# Patient Record
Sex: FEMALE | Race: WHITE | NOT HISPANIC OR LATINO | Employment: PART TIME | URBAN - METROPOLITAN AREA
[De-identification: names, ages, dates, MRNs, and addresses within clinical notes are randomized per-mention and may not be internally consistent; named-entity substitution may affect disease eponyms.]

---

## 2016-10-19 LAB
EXTERNAL ABO GROUPING: NORMAL
EXTERNAL ANTIBODY SCREEN: NEGATIVE
EXTERNAL HEPATITIS B SURFACE ANTIGEN: NEGATIVE
EXTERNAL RH FACTOR: POSITIVE
EXTERNAL SYPHILIS RPR SCREEN: NORMAL

## 2017-01-19 ENCOUNTER — ROUTINE PRENATAL (OUTPATIENT)
Dept: OBSTETRICS AND GYNECOLOGY | Facility: CLINIC | Age: 27
End: 2017-01-19

## 2017-01-19 VITALS — BODY MASS INDEX: 34.11 KG/M2 | WEIGHT: 231 LBS | SYSTOLIC BLOOD PRESSURE: 121 MMHG | DIASTOLIC BLOOD PRESSURE: 75 MMHG

## 2017-01-19 DIAGNOSIS — J45.909 ASTHMA AFFECTING PREGNANCY, ANTEPARTUM: ICD-10-CM

## 2017-01-19 DIAGNOSIS — Z34.02 ENCOUNTER FOR SUPERVISION OF NORMAL FIRST PREGNANCY IN SECOND TRIMESTER: Primary | ICD-10-CM

## 2017-01-19 DIAGNOSIS — O99.519 ASTHMA AFFECTING PREGNANCY, ANTEPARTUM: ICD-10-CM

## 2017-01-19 LAB — EXTERNAL GENETIC TESTING, MATERNAL BLOOD: NORMAL

## 2017-01-19 PROCEDURE — 0502F SUBSEQUENT PRENATAL CARE: CPT | Performed by: OBSTETRICS & GYNECOLOGY

## 2017-01-19 NOTE — PROGRESS NOTES
Patient without major complaints.  Her asthma has been much better controlled recently.  She does have an appointment to see Dr. Harding next week.  She does report rare episodes of feeling dizzy or lightheaded.  This is likely due to a vagal response.  We discussed preventative strategies such as increasing oral hydration, rising slowly, etc.  The patient verbalized understanding.  Otherwise doing well.  We discussed screening for aneuploidy and open neural tube defects today.  Limitations of screening were discussed.  The patient declines screening.  She reports that she would not want to terminate the pregnancy for any reason.  Plan ultrasound in 2 weeks for anatomy.  Return to see me in 4 weeks.  I spent 12 out of 15 minutes with the patient in face to face counseling of the above issues.

## 2017-01-19 NOTE — MR AVS SNAPSHOT
Melva Dunlap   1/19/2017 1:45 PM   Routine Prenatal    Dept Phone:  [phone]   Encounter #:  [id number]    Provider:  Bar Mendez MD   Department:  [facility]                Your Full Care Plan              Your Updated Medication List          This list is accurate as of: 1/19/17  2:00 PM.  Always use your most recent med list.                fluticasone 50 MCG/ACT nasal spray   Commonly known as:  FLONASE       fluticasone-salmeterol 100-50 MCG/DOSE DISKUS   Commonly known as:  ADVAIR DISKUS   Inhale 1 puff 2 (Two) Times a Day.       PRENATAL 1 PO       VENTOLIN HFA IN               We Performed the Following     Maternal Screen 4       You Were Diagnosed With        Codes Comments    Encounter for supervision of normal first pregnancy in second trimester    -  Primary ICD-10-CM: Z34.02  ICD-9-CM: V22.0     Asthma affecting pregnancy, antepartum     ICD-10-CM: O99.519, J45.909  ICD-9-CM: 648.93, 493.90       Instructions     None    Patient Instructions History      Upcoming Appointments     Visit Type Date Time Department    OB FOLLOWUP 1/19/2017  1:45 PM [facility]    ULTRASOUND 2/2/2017  2:00 PM [facility] [place]    OB FOLLOWUP 2/16/2017  2:30 PM [facility] PRES      MyChart Signup     Our records indicate that you have an active [employer] account.    You can view your After Visit Summary by going to [employer] and logging in with your Get[employer] username and password.  If you don't have a GetAFive username and password but a parent or guardian has access to your record, the parent or guardian should login with their own GetAFive username and password and access your record to view the After Visit Summary.    If you have questions, you can email [email or web address] or call [phone] to talk to our GetAFive staff.  Remember, GetAFive is NOT to be used for urgent needs.  For medical  emergencies, dial 911.               Other Info from Your Visit           Your Appointments     Feb 02, 2017  2:00 PM EST   Ultrasound with ULTRASOUND ESTHER ABERNATHY   [facility] (--)    [address]  [facility] [address] [phone]            Feb 16, 2017  2:30 PM EST   OB FOLLOWUP with Bar Mendez MD   [facility] (--)    [address] [facility] [address] [phone]              Allergies     No Known Allergies      Vital Signs     Blood Pressure Weight Last Menstrual Period Body Mass Index Smoking Status       121/75 231 lb (105 kg) 09/13/2016 (Exact Date) 34.11 kg/m2 Never Smoker       Problems and Diagnoses Noted     Asthma    Prenatal care      Results     Maternal Screen 4      Component    External Genetic Testing, Maternal Blood    declines

## 2017-02-02 ENCOUNTER — PROCEDURE VISIT (OUTPATIENT)
Dept: OBSTETRICS AND GYNECOLOGY | Facility: CLINIC | Age: 27
End: 2017-02-02

## 2017-02-02 DIAGNOSIS — Z36.89 SCREENING, ANTENATAL, FOR FETAL ANATOMIC SURVEY: Primary | ICD-10-CM

## 2017-02-02 PROCEDURE — 76805 OB US >/= 14 WKS SNGL FETUS: CPT | Performed by: OBSTETRICS & GYNECOLOGY

## 2017-02-16 ENCOUNTER — ROUTINE PRENATAL (OUTPATIENT)
Dept: OBSTETRICS AND GYNECOLOGY | Facility: CLINIC | Age: 27
End: 2017-02-16

## 2017-02-16 VITALS — DIASTOLIC BLOOD PRESSURE: 75 MMHG | BODY MASS INDEX: 35 KG/M2 | SYSTOLIC BLOOD PRESSURE: 121 MMHG | WEIGHT: 237 LBS

## 2017-02-16 DIAGNOSIS — J45.909 ASTHMA AFFECTING PREGNANCY, ANTEPARTUM: ICD-10-CM

## 2017-02-16 DIAGNOSIS — Z13.1 SCREENING FOR DIABETES MELLITUS: ICD-10-CM

## 2017-02-16 DIAGNOSIS — O99.519 ASTHMA AFFECTING PREGNANCY, ANTEPARTUM: ICD-10-CM

## 2017-02-16 DIAGNOSIS — Z34.02 ENCOUNTER FOR SUPERVISION OF NORMAL FIRST PREGNANCY IN SECOND TRIMESTER: Primary | ICD-10-CM

## 2017-02-16 PROCEDURE — 0502F SUBSEQUENT PRENATAL CARE: CPT | Performed by: OBSTETRICS & GYNECOLOGY

## 2017-02-16 RX ORDER — BUDESONIDE AND FORMOTEROL FUMARATE DIHYDRATE 80; 4.5 UG/1; UG/1
2 AEROSOL RESPIRATORY (INHALATION)
COMMUNITY
End: 2017-07-26

## 2017-02-16 NOTE — PROGRESS NOTES
Chief complaint: Pregnancy  History present illness: Patient is here for routine pregnancy visit.  She is without major complaints today.  She has seen the pulmonologist.  He started her on Symbicort.  She reports that her asthma has been well controlled as long as she takes her Symbicort daily.  She has not started to notice any fetal movement yet.  She is otherwise without complaints.  She denies vaginal bleeding or leakage of fluid.  The patient asked about starting on maternity leave.  She asks if it will be reasonable to start maternity leave on .  She is not having any major complications this time.  Objective: See flow sheet above  Imaging: Anatomy ultrasound with normal anatomic survey.  Assessment:    1.  27-year-old  1 at 21-2/7 weeks gestational age  2.  Asthma during pregnancy    Plan:  1.  Had a long conversation with the patient about the appropriate usage of maternity leave.  Explained to patient that currently I cannot see any medical reason why she would need to take maternity leave.  Patient's only, locations so far this pregnancy is with asthma, which has been well-controlled.  We will screen her for fetal growth throughout the third trimester with serial ultrasounds.  Should she begin to have issues related to complications of asthma which may be work related, it may be reasonable to start on maternity leave.  Absent these complications or any other new pregnancy-related compensations that may arise, I feel will be appropriate for the patient to continue to work well into the third trimester and even potentially up until the time of labor.  This was discussed with the patient who verbalized understanding.  2.  Glucose tolerance test at her next visit.  3.  Continue to follow up with pulmonology as scheduled.  Continue inhalers.  Serial growth every 4 weeks starting at 28 weeks.  I spent 12 out of 15 minutes with the patient in face to face counseling of the above issues.

## 2017-03-16 ENCOUNTER — RESULTS ENCOUNTER (OUTPATIENT)
Dept: OBSTETRICS AND GYNECOLOGY | Facility: CLINIC | Age: 27
End: 2017-03-16

## 2017-03-16 ENCOUNTER — ROUTINE PRENATAL (OUTPATIENT)
Dept: OBSTETRICS AND GYNECOLOGY | Facility: CLINIC | Age: 27
End: 2017-03-16

## 2017-03-16 VITALS — DIASTOLIC BLOOD PRESSURE: 86 MMHG | WEIGHT: 245 LBS | SYSTOLIC BLOOD PRESSURE: 124 MMHG | BODY MASS INDEX: 36.18 KG/M2

## 2017-03-16 DIAGNOSIS — J45.909 ASTHMA AFFECTING PREGNANCY, ANTEPARTUM: Primary | ICD-10-CM

## 2017-03-16 DIAGNOSIS — O99.519 ASTHMA AFFECTING PREGNANCY, ANTEPARTUM: Primary | ICD-10-CM

## 2017-03-16 DIAGNOSIS — Z34.02 ENCOUNTER FOR SUPERVISION OF NORMAL FIRST PREGNANCY IN SECOND TRIMESTER: ICD-10-CM

## 2017-03-16 DIAGNOSIS — Z13.1 SCREENING FOR DIABETES MELLITUS: ICD-10-CM

## 2017-03-16 LAB
EXTERNAL GTT 1 HOUR: 100
GLUCOSE 1H P 50 G GLC PO SERPL-MCNC: 100 MG/DL (ref 65–139)

## 2017-03-16 PROCEDURE — 0502F SUBSEQUENT PRENATAL CARE: CPT | Performed by: OBSTETRICS & GYNECOLOGY

## 2017-03-16 NOTE — PROGRESS NOTES
Chief complaint: Pregnancy, spotting  History present illness: Patient is here for routine pregnancy visit today.  She does report some light spotting when she went to the bathroom last night when she was wiping with the toilet paper.  It has resolved by this morning.  She does report fetal movement.  She denies contractions.  She does report some mild upper midline epigastric pain.  She denies nausea and vomiting.  No fevers or chills.  She denies dysuria.  Patient also has concerns about her activities at work.  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  She also requests to be able to sit down at work as needed.  She does have occasional dizzy spells, but these are manageable.  Objective: See flow sheet above  Abdomen: Soft, nontender, nondistended, no fundal tenderness.  She does have small diastases recti.  There is no hernia.  Fetal heart tones are 150.  Audible fetal movements are noted.  Assessment:  1.  27-year-old  1 at 25-2/7 weeks gestational age  2.  Vaginal spotting  3.  Screen for diabetes  4.  Asthma, well controlled  Plan:  1.  Reassurance is offered about the patient's spotting.  This seems to be a one-time thing.  No adverse signs are noted today.  Patient advised to notify us or come into the hospital if she has heavier bleeding like a period or heavier.  Also is advised about  labor signs including vaginal leakage of fluid, contractions, etc.  2.  Reassurance offered about the patient's mild epigastric pain.  This is likely related to her diastases recti.  No concerning signs on exam today.  3.  We discussed her work restrictions.  I explained to the patient that its very difficult to provide exact recommendations for any number of possible movements at work including pushing, pulling, twisting, bending, stooping, lifting, etc. I explained that there is little evidence regarding these activities.  I did  explain to the patient that recurrent strenuous physical labor has been associated with some types of adverse fetal outcomes such as fetal growth traction, etc.  In general I recommended frequent breaks, sitting, and avoiding lifting greater than 20 pounds.  I recommended avoiding any recurrent strenuous physical activity.  I wouldn't recommend any activity the post-patient risk of falling onto her abdomen.  She verbalized understanding.  Patient is to follow-up in 3 weeks in the office.  Ultrasound for growth secondary to asthma in 3 weeks.  GTT today.  I spent 20 out of 25 minutes with the patient in face to face counseling of the above issues.

## 2017-03-23 ENCOUNTER — TELEPHONE (OUTPATIENT)
Dept: OBSTETRICS AND GYNECOLOGY | Facility: CLINIC | Age: 27
End: 2017-03-23

## 2017-03-23 NOTE — TELEPHONE ENCOUNTER
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 It sounds like the patient's symptoms have resolved with just some rest.  She should be reassured that I do not think that any immediate intervention is necessary.  I would advise her to keep her regular scheduled appointment.    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  She sat for about 10 minutes and propped her feet up, it did help for awhile, but it came back when she started working again, so she left work early.  She woke up this morning feeling better, does have some back pain, but states that is normal for her.  Pt is concerned, please advise.    Pt # [phone]

## 2017-04-05 ENCOUNTER — ROUTINE PRENATAL (OUTPATIENT)
Dept: OBSTETRICS AND GYNECOLOGY | Facility: CLINIC | Age: 27
End: 2017-04-05

## 2017-04-05 ENCOUNTER — PROCEDURE VISIT (OUTPATIENT)
Dept: OBSTETRICS AND GYNECOLOGY | Facility: CLINIC | Age: 27
End: 2017-04-05

## 2017-04-05 VITALS — SYSTOLIC BLOOD PRESSURE: 125 MMHG | WEIGHT: 247 LBS | DIASTOLIC BLOOD PRESSURE: 76 MMHG | BODY MASS INDEX: 36.48 KG/M2

## 2017-04-05 DIAGNOSIS — J45.909 ASTHMA AFFECTING PREGNANCY, ANTEPARTUM: ICD-10-CM

## 2017-04-05 DIAGNOSIS — J45.909 ASTHMA AFFECTING PREGNANCY, ANTEPARTUM: Primary | ICD-10-CM

## 2017-04-05 DIAGNOSIS — O26.843 UTERINE SIZE DATE DISCREPANCY PREGNANCY, THIRD TRIMESTER: Primary | ICD-10-CM

## 2017-04-05 DIAGNOSIS — O99.519 ASTHMA AFFECTING PREGNANCY, ANTEPARTUM: ICD-10-CM

## 2017-04-05 DIAGNOSIS — Z23 NEED FOR TDAP VACCINATION: ICD-10-CM

## 2017-04-05 DIAGNOSIS — O99.519 ASTHMA AFFECTING PREGNANCY, ANTEPARTUM: Primary | ICD-10-CM

## 2017-04-05 PROBLEM — Z13.1 SCREENING FOR DIABETES MELLITUS: Status: RESOLVED | Noted: 2017-02-16 | Resolved: 2017-04-05

## 2017-04-05 PROCEDURE — 90715 TDAP VACCINE 7 YRS/> IM: CPT | Performed by: OBSTETRICS & GYNECOLOGY

## 2017-04-05 PROCEDURE — 90471 IMMUNIZATION ADMIN: CPT | Performed by: OBSTETRICS & GYNECOLOGY

## 2017-04-05 PROCEDURE — 0502F SUBSEQUENT PRENATAL CARE: CPT | Performed by: OBSTETRICS & GYNECOLOGY

## 2017-04-05 PROCEDURE — 76816 OB US FOLLOW-UP PER FETUS: CPT | Performed by: OBSTETRICS & GYNECOLOGY

## 2017-04-05 RX ORDER — ALBUTEROL SULFATE 90 UG/1
AEROSOL, METERED RESPIRATORY (INHALATION)
Refills: 5 | COMMUNITY
Start: 2017-03-29 | End: 2017-05-31 | Stop reason: SDUPTHER

## 2017-04-05 NOTE — PROGRESS NOTES
Chief complaint: Pregnancy  History of present illness: Patient is here for routine OB visit today.  She is without major complaints.  She reports she's had no more episodes of bleeding since her last visit.  She reports that she feels that she is doing well at work.  She denies frequent dizzy spells of this point.  They just happened rarely.  She reports feeling tired during the day, but feels this is normal.  Good fetal movement.  No contractions.  Patient reports that her asthma has generally been well-controlled.  Patient asked when she should start her maternity leave.  She reports that she would like to continue to work at this point.  Objective: See flow sheet above  Gen.: No acute distress, awake and oriented ×3  Abdomen: Soft, no fundal tenderness  Extremities: No lower extremity edema  Labs: One-hour glucose tolerance test 100  Ultrasound: Estimated fetal weight 2 lbs. 13 oz. or the 60th percentile.  Amniotic fluid index 12 cm.  Vertex.  Assessment:  1.  27-year-old  1 at 28 and one sevenths weeks gestational age  2.  Asthma, mild persistent  3.  Normal fetal growth  4.  Need for Tdap  Plan:  1.  Ultrasound findings discussed with the patient today all questions answered.  We'll plan to repeat ultrasound every 4 weeks for fetal growth secondary to history of asthma.  2.  Patient's asthma is generally been doing well.  Continue home inhalers as.  As you prescribed.  Advised patient about signs of asthma exacerbation during pregnancy.  3.  Regarding the patient's question about maternity leave.  Currently, I see no reason that the patient would need to start on maternity leave at any time in the near future.  Pregnancies been progressing well.  She has normal fetal growth.  Her blood pressures been normal.  Patient begins to develop issues and these area or with worsening respiratory function, may consider starting leave at that time.  She verbalized understanding.  4.  Return to the office in 2  weeks.  OB follow-up appointment in 4 weeks.  Ultrasound in 4 weeks for follow-up growth.  5.  Recommended Tdap today.  Patient received Tdap today.    I spent 12 out of 15 minutes with the patient in face to face counseling of the above issues.

## 2017-04-19 ENCOUNTER — ROUTINE PRENATAL (OUTPATIENT)
Dept: OBSTETRICS AND GYNECOLOGY | Facility: CLINIC | Age: 27
End: 2017-04-19

## 2017-04-19 VITALS — WEIGHT: 252 LBS | BODY MASS INDEX: 37.21 KG/M2 | SYSTOLIC BLOOD PRESSURE: 129 MMHG | DIASTOLIC BLOOD PRESSURE: 82 MMHG

## 2017-04-19 DIAGNOSIS — O99.519 ASTHMA AFFECTING PREGNANCY, ANTEPARTUM: ICD-10-CM

## 2017-04-19 DIAGNOSIS — Z34.02 ENCOUNTER FOR SUPERVISION OF NORMAL FIRST PREGNANCY IN SECOND TRIMESTER: Primary | ICD-10-CM

## 2017-04-19 DIAGNOSIS — J45.909 ASTHMA AFFECTING PREGNANCY, ANTEPARTUM: ICD-10-CM

## 2017-04-19 PROCEDURE — 0502F SUBSEQUENT PRENATAL CARE: CPT | Performed by: OBSTETRICS & GYNECOLOGY

## 2017-04-19 NOTE — PROGRESS NOTES
Chief complaint: Pregnancy, right knee pain  History present illness: Patient is here for her prenatal visit today.  She is without major complaints.  She does report some occasional pain and weakness in her right knee.  Otherwise she is doing well.  She does report increased frequency of using her inhaler.  She has not been using the Symbicort inhaler for the last 2 weeks.  She plans to get refilled soon.  She notes good fetal movement.  Denies contractions, vaginal bleeding, leakage of fluid  Objective: See vital signs above  Gen.: No acute distress, awake and oriented ×3  Abdomen: Soft, nontender, fundal height 31 cm, fetal heart tones 140  Showed: No lower extremity edema, tenderness  Assessment:  1.  27-year-old  1 at 30-1/7 weeks gestational age  2.  Asthma during pregnancy  Plan:  1.  Patient's encouraged to restart her Symbicort inhaler and continue albuterol as needed.  2.  Plan repeat ultrasound in 2 weeks for growth secondary to asthma in pregnancy  3.  Return to the office in 2 weeks before.  Reassurance offered to the patient that her right knee pain, likely due to increased weight and body changes.  No concerning signs on exam today.

## 2017-05-03 ENCOUNTER — PROCEDURE VISIT (OUTPATIENT)
Dept: OBSTETRICS AND GYNECOLOGY | Facility: CLINIC | Age: 27
End: 2017-05-03

## 2017-05-03 ENCOUNTER — ROUTINE PRENATAL (OUTPATIENT)
Dept: OBSTETRICS AND GYNECOLOGY | Facility: CLINIC | Age: 27
End: 2017-05-03

## 2017-05-03 VITALS — SYSTOLIC BLOOD PRESSURE: 134 MMHG | DIASTOLIC BLOOD PRESSURE: 89 MMHG | BODY MASS INDEX: 38.25 KG/M2 | WEIGHT: 259 LBS

## 2017-05-03 DIAGNOSIS — O99.519 ASTHMA AFFECTING PREGNANCY, ANTEPARTUM: ICD-10-CM

## 2017-05-03 DIAGNOSIS — J45.909 ASTHMA COMPLICATING PREGNANCY, ANTEPARTUM: ICD-10-CM

## 2017-05-03 DIAGNOSIS — J45.909 ASTHMA AFFECTING PREGNANCY, ANTEPARTUM: ICD-10-CM

## 2017-05-03 DIAGNOSIS — O99.519 ASTHMA COMPLICATING PREGNANCY, ANTEPARTUM: ICD-10-CM

## 2017-05-03 DIAGNOSIS — Z34.03 ENCOUNTER FOR SUPERVISION OF NORMAL FIRST PREGNANCY IN THIRD TRIMESTER: Primary | ICD-10-CM

## 2017-05-03 DIAGNOSIS — Z36.89 ENCOUNTER FOR ULTRASOUND TO CHECK FETAL GROWTH: Primary | ICD-10-CM

## 2017-05-03 PROCEDURE — 0502F SUBSEQUENT PRENATAL CARE: CPT | Performed by: OBSTETRICS & GYNECOLOGY

## 2017-05-03 PROCEDURE — 76816 OB US FOLLOW-UP PER FETUS: CPT | Performed by: OBSTETRICS & GYNECOLOGY

## 2017-05-17 ENCOUNTER — ROUTINE PRENATAL (OUTPATIENT)
Dept: OBSTETRICS AND GYNECOLOGY | Facility: CLINIC | Age: 27
End: 2017-05-17

## 2017-05-17 VITALS — DIASTOLIC BLOOD PRESSURE: 101 MMHG | BODY MASS INDEX: 38.69 KG/M2 | WEIGHT: 262 LBS | SYSTOLIC BLOOD PRESSURE: 144 MMHG

## 2017-05-17 DIAGNOSIS — O16.3 ELEVATED BLOOD PRESSURE AFFECTING PREGNANCY IN THIRD TRIMESTER, ANTEPARTUM: Primary | ICD-10-CM

## 2017-05-17 DIAGNOSIS — Z34.03 ENCOUNTER FOR SUPERVISION OF NORMAL FIRST PREGNANCY IN THIRD TRIMESTER: ICD-10-CM

## 2017-05-17 PROCEDURE — 0502F SUBSEQUENT PRENATAL CARE: CPT | Performed by: OBSTETRICS & GYNECOLOGY

## 2017-05-18 LAB
ALBUMIN SERPL-MCNC: 3.6 G/DL (ref 3.5–5.2)
ALBUMIN/GLOB SERPL: 1.3 G/DL
ALP SERPL-CCNC: 105 U/L (ref 39–117)
ALT SERPL-CCNC: 20 U/L (ref 1–33)
AST SERPL-CCNC: 17 U/L (ref 1–32)
BILIRUB SERPL-MCNC: 0.2 MG/DL (ref 0.1–1.2)
BUN SERPL-MCNC: 5 MG/DL (ref 6–20)
BUN/CREAT SERPL: 8.5 (ref 7–25)
CALCIUM SERPL-MCNC: 9.4 MG/DL (ref 8.6–10.5)
CHLORIDE SERPL-SCNC: 100 MMOL/L (ref 98–107)
CO2 SERPL-SCNC: 24.3 MMOL/L (ref 22–29)
CREAT SERPL-MCNC: 0.59 MG/DL (ref 0.57–1)
ERYTHROCYTE [DISTWIDTH] IN BLOOD BY AUTOMATED COUNT: 13.2 % (ref 11.7–13)
GLOBULIN SER CALC-MCNC: 2.8 GM/DL
GLUCOSE SERPL-MCNC: 81 MG/DL (ref 65–99)
HCT VFR BLD AUTO: 40.6 % (ref 35.6–45.5)
HGB BLD-MCNC: 13.1 G/DL (ref 11.9–15.5)
MCH RBC QN AUTO: 27.9 PG (ref 26.9–32)
MCHC RBC AUTO-ENTMCNC: 32.3 G/DL (ref 32.4–36.3)
MCV RBC AUTO: 86.6 FL (ref 80.5–98.2)
PLATELET # BLD AUTO: 316 10*3/MM3 (ref 140–500)
POTASSIUM SERPL-SCNC: 4.9 MMOL/L (ref 3.5–5.2)
PROT SERPL-MCNC: 6.4 G/DL (ref 6–8.5)
RBC # BLD AUTO: 4.69 10*6/MM3 (ref 3.9–5.2)
SODIUM SERPL-SCNC: 136 MMOL/L (ref 136–145)
WBC # BLD AUTO: 8.95 10*3/MM3 (ref 4.5–10.7)

## 2017-05-19 ENCOUNTER — TELEPHONE (OUTPATIENT)
Dept: OBSTETRICS AND GYNECOLOGY | Facility: CLINIC | Age: 27
End: 2017-05-19

## 2017-05-22 ENCOUNTER — TELEPHONE (OUTPATIENT)
Dept: OBSTETRICS AND GYNECOLOGY | Facility: CLINIC | Age: 27
End: 2017-05-22

## 2017-05-22 DIAGNOSIS — O16.3 ELEVATED BLOOD PRESSURE AFFECTING PREGNANCY IN THIRD TRIMESTER, ANTEPARTUM: Primary | ICD-10-CM

## 2017-05-23 ENCOUNTER — PROCEDURE VISIT (OUTPATIENT)
Dept: OBSTETRICS AND GYNECOLOGY | Facility: CLINIC | Age: 27
End: 2017-05-23

## 2017-05-23 ENCOUNTER — ROUTINE PRENATAL (OUTPATIENT)
Dept: OBSTETRICS AND GYNECOLOGY | Facility: CLINIC | Age: 27
End: 2017-05-23

## 2017-05-23 VITALS — BODY MASS INDEX: 38.84 KG/M2 | WEIGHT: 263 LBS | SYSTOLIC BLOOD PRESSURE: 131 MMHG | DIASTOLIC BLOOD PRESSURE: 85 MMHG

## 2017-05-23 DIAGNOSIS — O13.3 PIH (PREGNANCY INDUCED HYPERTENSION), THIRD TRIMESTER: ICD-10-CM

## 2017-05-23 DIAGNOSIS — O16.3 ELEVATED BLOOD PRESSURE AFFECTING PREGNANCY IN THIRD TRIMESTER, ANTEPARTUM: ICD-10-CM

## 2017-05-23 DIAGNOSIS — O99.213 OBESITY AFFECTING PREGNANCY IN THIRD TRIMESTER: ICD-10-CM

## 2017-05-23 DIAGNOSIS — O99.519 ASTHMA AFFECTING PREGNANCY, ANTEPARTUM: Primary | ICD-10-CM

## 2017-05-23 DIAGNOSIS — Z34.03 ENCOUNTER FOR SUPERVISION OF NORMAL FIRST PREGNANCY IN THIRD TRIMESTER: Primary | ICD-10-CM

## 2017-05-23 DIAGNOSIS — J45.909 ASTHMA AFFECTING PREGNANCY, ANTEPARTUM: Primary | ICD-10-CM

## 2017-05-23 PROCEDURE — 76819 FETAL BIOPHYS PROFIL W/O NST: CPT | Performed by: OBSTETRICS & GYNECOLOGY

## 2017-05-23 PROCEDURE — 76816 OB US FOLLOW-UP PER FETUS: CPT | Performed by: OBSTETRICS & GYNECOLOGY

## 2017-05-23 PROCEDURE — 0502F SUBSEQUENT PRENATAL CARE: CPT | Performed by: OBSTETRICS & GYNECOLOGY

## 2017-05-24 ENCOUNTER — TELEPHONE (OUTPATIENT)
Dept: OBSTETRICS AND GYNECOLOGY | Facility: CLINIC | Age: 27
End: 2017-05-24

## 2017-05-24 LAB
PROT 24H UR-MRATE: 354 MG/24 HR (ref 30–150)
PROT UR-MCNC: 11.6 MG/DL

## 2017-05-25 ENCOUNTER — TELEPHONE (OUTPATIENT)
Dept: OBSTETRICS AND GYNECOLOGY | Facility: CLINIC | Age: 27
End: 2017-05-25

## 2017-05-26 LAB — B-HEM STREP SPEC QL CULT: POSITIVE

## 2017-05-31 ENCOUNTER — ROUTINE PRENATAL (OUTPATIENT)
Dept: OBSTETRICS AND GYNECOLOGY | Facility: CLINIC | Age: 27
End: 2017-05-31

## 2017-05-31 VITALS — WEIGHT: 264 LBS | DIASTOLIC BLOOD PRESSURE: 100 MMHG | SYSTOLIC BLOOD PRESSURE: 153 MMHG | BODY MASS INDEX: 38.99 KG/M2

## 2017-05-31 DIAGNOSIS — O14.03: ICD-10-CM

## 2017-05-31 DIAGNOSIS — Z34.03 ENCOUNTER FOR SUPERVISION OF NORMAL FIRST PREGNANCY IN THIRD TRIMESTER: Primary | ICD-10-CM

## 2017-05-31 LAB — EXTERNAL GROUP B STREP ANTIGEN: NORMAL

## 2017-05-31 PROCEDURE — 0502F SUBSEQUENT PRENATAL CARE: CPT | Performed by: OBSTETRICS & GYNECOLOGY

## 2017-06-02 ENCOUNTER — PROCEDURE VISIT (OUTPATIENT)
Dept: OBSTETRICS AND GYNECOLOGY | Facility: CLINIC | Age: 27
End: 2017-06-02

## 2017-06-02 ENCOUNTER — ROUTINE PRENATAL (OUTPATIENT)
Dept: OBSTETRICS AND GYNECOLOGY | Facility: CLINIC | Age: 27
End: 2017-06-02

## 2017-06-02 VITALS — WEIGHT: 267 LBS | BODY MASS INDEX: 39.43 KG/M2 | DIASTOLIC BLOOD PRESSURE: 102 MMHG | SYSTOLIC BLOOD PRESSURE: 141 MMHG

## 2017-06-02 DIAGNOSIS — O14.03: ICD-10-CM

## 2017-06-02 DIAGNOSIS — O99.519 ASTHMA AFFECTING PREGNANCY, ANTEPARTUM: Primary | ICD-10-CM

## 2017-06-02 DIAGNOSIS — Z34.03 ENCOUNTER FOR SUPERVISION OF NORMAL FIRST PREGNANCY IN THIRD TRIMESTER: Primary | ICD-10-CM

## 2017-06-02 DIAGNOSIS — O16.3 ELEVATED BLOOD PRESSURE AFFECTING PREGNANCY IN THIRD TRIMESTER, ANTEPARTUM: ICD-10-CM

## 2017-06-02 DIAGNOSIS — J45.909 ASTHMA AFFECTING PREGNANCY, ANTEPARTUM: Primary | ICD-10-CM

## 2017-06-02 DIAGNOSIS — O13.3 PIH (PREGNANCY INDUCED HYPERTENSION), THIRD TRIMESTER: ICD-10-CM

## 2017-06-02 PROCEDURE — 76819 FETAL BIOPHYS PROFIL W/O NST: CPT | Performed by: OBSTETRICS & GYNECOLOGY

## 2017-06-02 PROCEDURE — 0502F SUBSEQUENT PRENATAL CARE: CPT | Performed by: OBSTETRICS & GYNECOLOGY

## 2017-06-02 NOTE — PROGRESS NOTES
Ob follow up    Melva Dunlap is a 27 y.o.  36w3d patient being seen today for her obstetrical visit. Patient reports headache yesterday, treated with Tylenol. . Fetal movement: normal.      ROS - Denies leaking fluid, vaginal bleeding and notes good fetal movement.     BP (!) 141/102  Wt 267 lb (121 kg)  LMP 2016 (Exact Date)  BMI 39.43 kg/m2    FHT:  151 BPM    Uterine Size: size equals dates   Presentations: cephalic   Pelvic Exam:     Dilation: deferred     Effacement: deferred    Station:  deferred                 Assessment    1) Pregnancy at 36w3d  2) Fetal status reassuring   3) GBS status - Positive  4) Mild Pre-eclampsia   Asymptomatic  DTRs 2+, No clonus  BP at home 134/110.   Bpp 8/8, ZAHIDA 10.65 cm   Given parameters for BP and PIH warnings   See Dr. Mendez on Monday     Plan    Labor warnings   [facility]        Clif Berry MD   2017  9:10 AM

## 2017-06-05 ENCOUNTER — HOSPITAL ENCOUNTER (OUTPATIENT)
Facility: HOSPITAL | Age: 27
Setting detail: OBSERVATION
Discharge: HOME | End: 2017-06-06
Attending: OBSTETRICS & GYNECOLOGY | Admitting: OBSTETRICS & GYNECOLOGY

## 2017-06-05 ENCOUNTER — ROUTINE PRENATAL (OUTPATIENT)
Dept: OBSTETRICS AND GYNECOLOGY | Facility: CLINIC | Age: 27
End: 2017-06-05

## 2017-06-05 ENCOUNTER — PROCEDURE VISIT (OUTPATIENT)
Dept: OBSTETRICS AND GYNECOLOGY | Facility: CLINIC | Age: 27
End: 2017-06-05

## 2017-06-05 VITALS — DIASTOLIC BLOOD PRESSURE: 86 MMHG | WEIGHT: 267 LBS | SYSTOLIC BLOOD PRESSURE: 146 MMHG | BODY MASS INDEX: 39.43 KG/M2

## 2017-06-05 DIAGNOSIS — J45.909 ASTHMA AFFECTING PREGNANCY, ANTEPARTUM: Primary | ICD-10-CM

## 2017-06-05 DIAGNOSIS — O13.3 PIH (PREGNANCY INDUCED HYPERTENSION), THIRD TRIMESTER: ICD-10-CM

## 2017-06-05 DIAGNOSIS — O14.03 MILD PRE-ECLAMPSIA IN THIRD TRIMESTER: Primary | ICD-10-CM

## 2017-06-05 DIAGNOSIS — O99.519 ASTHMA AFFECTING PREGNANCY, ANTEPARTUM: Primary | ICD-10-CM

## 2017-06-05 PROBLEM — Z34.90 PREGNANCY: Status: ACTIVE | Noted: 2017-06-05

## 2017-06-05 LAB
EXPIRATION DATE: ABNORMAL
Lab: ABNORMAL
PROT UR STRIP-MCNC: ABNORMAL MG/DL

## 2017-06-05 PROCEDURE — 76819 FETAL BIOPHYS PROFIL W/O NST: CPT | Performed by: OBSTETRICS & GYNECOLOGY

## 2017-06-05 PROCEDURE — 0502F SUBSEQUENT PRENATAL CARE: CPT | Performed by: OBSTETRICS & GYNECOLOGY

## 2017-06-05 RX ORDER — RANITIDINE 150 MG/1
150 TABLET ORAL 2 TIMES DAILY
COMMUNITY
End: 2017-07-26

## 2017-06-06 VITALS
HEART RATE: 71 BPM | TEMPERATURE: 97.7 F | DIASTOLIC BLOOD PRESSURE: 61 MMHG | BODY MASS INDEX: 39.66 KG/M2 | HEIGHT: 69 IN | RESPIRATION RATE: 18 BRPM | SYSTOLIC BLOOD PRESSURE: 141 MMHG | OXYGEN SATURATION: 97 % | WEIGHT: 267.8 LBS

## 2017-06-06 LAB
ALBUMIN SERPL-MCNC: 3.2 G/DL (ref 3.5–5.2)
ALBUMIN SERPL-MCNC: 3.6 G/DL (ref 3.5–5.2)
ALBUMIN/GLOB SERPL: 1 G/DL
ALBUMIN/GLOB SERPL: 1.1 G/DL
ALP SERPL-CCNC: 111 U/L (ref 39–117)
ALP SERPL-CCNC: 126 U/L (ref 39–117)
ALT SERPL W P-5'-P-CCNC: 25 U/L (ref 1–33)
ALT SERPL-CCNC: 21 U/L (ref 1–33)
ANION GAP SERPL CALCULATED.3IONS-SCNC: 15.2 MMOL/L
AST SERPL-CCNC: 22 U/L (ref 1–32)
AST SERPL-CCNC: 23 U/L (ref 1–32)
BACTERIA UR QL AUTO: ABNORMAL /HPF
BASOPHILS # BLD AUTO: 0.02 10*3/MM3 (ref 0–0.2)
BASOPHILS NFR BLD AUTO: 0.2 % (ref 0–1.5)
BILIRUB SERPL-MCNC: 0.2 MG/DL (ref 0.1–1.2)
BILIRUB SERPL-MCNC: <0.2 MG/DL (ref 0.1–1.2)
BILIRUB UR QL STRIP: NEGATIVE
BUN BLD-MCNC: 9 MG/DL (ref 6–20)
BUN SERPL-MCNC: 7 MG/DL (ref 6–20)
BUN/CREAT SERPL: 11.3 (ref 7–25)
BUN/CREAT SERPL: 15.8 (ref 7–25)
CALCIUM SERPL-MCNC: 9.9 MG/DL (ref 8.6–10.5)
CALCIUM SPEC-SCNC: 9.3 MG/DL (ref 8.6–10.5)
CHLORIDE SERPL-SCNC: 101 MMOL/L (ref 98–107)
CHLORIDE SERPL-SCNC: 99 MMOL/L (ref 98–107)
CLARITY UR: CLEAR
CO2 SERPL-SCNC: 22.8 MMOL/L (ref 22–29)
CO2 SERPL-SCNC: 24.3 MMOL/L (ref 22–29)
COLOR UR: YELLOW
CREAT BLD-MCNC: 0.57 MG/DL (ref 0.57–1)
CREAT SERPL-MCNC: 0.62 MG/DL (ref 0.57–1)
DEPRECATED RDW RBC AUTO: 43.8 FL (ref 37–54)
EOSINOPHIL # BLD AUTO: 0.27 10*3/MM3 (ref 0–0.7)
EOSINOPHIL NFR BLD AUTO: 2.1 % (ref 0.3–6.2)
ERYTHROCYTE [DISTWIDTH] IN BLOOD BY AUTOMATED COUNT: 14.3 % (ref 11.7–13)
ERYTHROCYTE [DISTWIDTH] IN BLOOD BY AUTOMATED COUNT: 14.6 % (ref 11.7–13)
GFR SERPL CREATININE-BSD FRML MDRD: 127 ML/MIN/1.73
GLOBULIN SER CALC-MCNC: 3.2 GM/DL
GLOBULIN UR ELPH-MCNC: 3.1 GM/DL
GLUCOSE BLD-MCNC: 104 MG/DL (ref 65–99)
GLUCOSE SERPL-MCNC: 86 MG/DL (ref 65–99)
GLUCOSE UR STRIP-MCNC: NEGATIVE MG/DL
HCT VFR BLD AUTO: 39.7 % (ref 35.6–45.5)
HCT VFR BLD AUTO: 42.7 % (ref 35.6–45.5)
HGB BLD-MCNC: 12.9 G/DL (ref 11.9–15.5)
HGB BLD-MCNC: 13.8 G/DL (ref 11.9–15.5)
HGB UR QL STRIP.AUTO: NEGATIVE
HYALINE CASTS UR QL AUTO: ABNORMAL /LPF
IMM GRANULOCYTES # BLD: 0.08 10*3/MM3 (ref 0–0.03)
IMM GRANULOCYTES NFR BLD: 0.6 % (ref 0–0.5)
KETONES UR QL STRIP: NEGATIVE
LEUKOCYTE ESTERASE UR QL STRIP.AUTO: ABNORMAL
LYMPHOCYTES # BLD AUTO: 2.35 10*3/MM3 (ref 0.9–4.8)
LYMPHOCYTES NFR BLD AUTO: 18 % (ref 19.6–45.3)
MCH RBC QN AUTO: 28 PG (ref 26.9–32)
MCH RBC QN AUTO: 28.5 PG (ref 26.9–32)
MCHC RBC AUTO-ENTMCNC: 32.3 G/DL (ref 32.4–36.3)
MCHC RBC AUTO-ENTMCNC: 32.5 G/DL (ref 32.4–36.3)
MCV RBC AUTO: 86.1 FL (ref 80.5–98.2)
MCV RBC AUTO: 88.2 FL (ref 80.5–98.2)
MONOCYTES # BLD AUTO: 0.98 10*3/MM3 (ref 0.2–1.2)
MONOCYTES NFR BLD AUTO: 7.5 % (ref 5–12)
NEUTROPHILS # BLD AUTO: 9.34 10*3/MM3 (ref 1.9–8.1)
NEUTROPHILS NFR BLD AUTO: 71.6 % (ref 42.7–76)
NITRITE UR QL STRIP: NEGATIVE
PH UR STRIP.AUTO: 6 [PH] (ref 5–8)
PLATELET # BLD AUTO: 268 10*3/MM3 (ref 140–500)
PLATELET # BLD AUTO: 316 10*3/MM3 (ref 140–500)
PMV BLD AUTO: 10.5 FL (ref 6–12)
POTASSIUM BLD-SCNC: 3.6 MMOL/L (ref 3.5–5.2)
POTASSIUM SERPL-SCNC: 4.2 MMOL/L (ref 3.5–5.2)
PROT SERPL-MCNC: 6.3 G/DL (ref 6–8.5)
PROT SERPL-MCNC: 6.8 G/DL (ref 6–8.5)
PROT UR QL STRIP: NEGATIVE
RBC # BLD AUTO: 4.61 10*6/MM3 (ref 3.9–5.2)
RBC # BLD AUTO: 4.84 10*6/MM3 (ref 3.9–5.2)
RBC # UR: ABNORMAL /HPF
REF LAB TEST METHOD: ABNORMAL
SODIUM BLD-SCNC: 139 MMOL/L (ref 136–145)
SODIUM SERPL-SCNC: 137 MMOL/L (ref 136–145)
SP GR UR STRIP: 1.01 (ref 1–1.03)
SQUAMOUS #/AREA URNS HPF: ABNORMAL /HPF
UROBILINOGEN UR QL STRIP: ABNORMAL
WBC # BLD AUTO: 12.16 10*3/MM3 (ref 4.5–10.7)
WBC NRBC COR # BLD: 13.04 10*3/MM3 (ref 4.5–10.7)
WBC UR QL AUTO: ABNORMAL /HPF

## 2017-06-06 PROCEDURE — 85025 COMPLETE CBC W/AUTO DIFF WBC: CPT | Performed by: OBSTETRICS & GYNECOLOGY

## 2017-06-06 PROCEDURE — 81001 URINALYSIS AUTO W/SCOPE: CPT | Performed by: OBSTETRICS & GYNECOLOGY

## 2017-06-06 PROCEDURE — 80053 COMPREHEN METABOLIC PANEL: CPT | Performed by: OBSTETRICS & GYNECOLOGY

## 2017-06-06 PROCEDURE — G0378 HOSPITAL OBSERVATION PER HR: HCPCS

## 2017-06-06 PROCEDURE — 87086 URINE CULTURE/COLONY COUNT: CPT | Performed by: OBSTETRICS & GYNECOLOGY

## 2017-06-06 PROCEDURE — 59025 FETAL NON-STRESS TEST: CPT

## 2017-06-06 PROCEDURE — 59025 FETAL NON-STRESS TEST: CPT | Performed by: OBSTETRICS & GYNECOLOGY

## 2017-06-06 NOTE — NON STRESS TEST
Melva Dunlap, a  at 37w0d with an WILNER of 2017, by Ultrasound, was seen at [facility] for a nonstress test.    Chief Complaint   Patient presents with   • Hypertension     Pt reports her BP was elevated at home running 160's/100's. Pt reports +FM, reports some haley chakraborty not painful, denies LOF or current VB. Reports some spotting after getting cervix checked. Pt denies HA, blurry vision or RUQ pain.       Interpretation A  Nonstress Test Interpretation A: Reactive (17 0100 : Pia Witt, BENJAMIN)

## 2017-06-07 ENCOUNTER — TELEPHONE (OUTPATIENT)
Dept: OBSTETRICS AND GYNECOLOGY | Facility: CLINIC | Age: 27
End: 2017-06-07

## 2017-06-07 LAB — BACTERIA SPEC AEROBE CULT: NORMAL

## 2017-06-07 NOTE — TELEPHONE ENCOUNTER
----- Message from Clif Berry MD sent at 6/7/2017  9:52 AM EDT -----  Pam, normal urine culture. Please let her know. Thanks Dr. Berry

## 2017-06-10 ENCOUNTER — HOSPITAL ENCOUNTER (OUTPATIENT)
Facility: HOSPITAL | Age: 27
Setting detail: OBSERVATION
Discharge: HOME | End: 2017-06-10
Attending: OBSTETRICS & GYNECOLOGY | Admitting: OBSTETRICS & GYNECOLOGY

## 2017-06-10 VITALS
SYSTOLIC BLOOD PRESSURE: 125 MMHG | HEIGHT: 69 IN | WEIGHT: 271 LBS | RESPIRATION RATE: 18 BRPM | TEMPERATURE: 98.4 F | HEART RATE: 69 BPM | BODY MASS INDEX: 40.14 KG/M2 | DIASTOLIC BLOOD PRESSURE: 69 MMHG | OXYGEN SATURATION: 97 %

## 2017-06-10 LAB
ALBUMIN SERPL-MCNC: 3.1 G/DL (ref 3.5–5.2)
ALBUMIN/GLOB SERPL: 1 G/DL
ALP SERPL-CCNC: 110 U/L (ref 39–117)
ALT SERPL W P-5'-P-CCNC: 15 U/L (ref 1–33)
ANION GAP SERPL CALCULATED.3IONS-SCNC: 15.9 MMOL/L
AST SERPL-CCNC: 13 U/L (ref 1–32)
BACTERIA UR QL AUTO: ABNORMAL /HPF
BASOPHILS # BLD AUTO: 0.02 10*3/MM3 (ref 0–0.2)
BASOPHILS NFR BLD AUTO: 0.2 % (ref 0–1.5)
BILIRUB SERPL-MCNC: <0.2 MG/DL (ref 0.1–1.2)
BILIRUB UR QL STRIP: NEGATIVE
BUN BLD-MCNC: 11 MG/DL (ref 6–20)
BUN/CREAT SERPL: 17.5 (ref 7–25)
CALCIUM SPEC-SCNC: 9 MG/DL (ref 8.6–10.5)
CHLORIDE SERPL-SCNC: 100 MMOL/L (ref 98–107)
CLARITY UR: ABNORMAL
CO2 SERPL-SCNC: 19.1 MMOL/L (ref 22–29)
COD CRY URNS QL: ABNORMAL /HPF
COLOR UR: YELLOW
CREAT BLD-MCNC: 0.63 MG/DL (ref 0.57–1)
DEPRECATED RDW RBC AUTO: 44.6 FL (ref 37–54)
EOSINOPHIL # BLD AUTO: 0.21 10*3/MM3 (ref 0–0.7)
EOSINOPHIL NFR BLD AUTO: 1.8 % (ref 0.3–6.2)
ERYTHROCYTE [DISTWIDTH] IN BLOOD BY AUTOMATED COUNT: 14.5 % (ref 11.7–13)
GFR SERPL CREATININE-BSD FRML MDRD: 113 ML/MIN/1.73
GLOBULIN UR ELPH-MCNC: 3.2 GM/DL
GLUCOSE BLD-MCNC: 102 MG/DL (ref 65–99)
GLUCOSE UR STRIP-MCNC: NEGATIVE MG/DL
HCT VFR BLD AUTO: 39 % (ref 35.6–45.5)
HGB BLD-MCNC: 12.6 G/DL (ref 11.9–15.5)
HGB UR QL STRIP.AUTO: NEGATIVE
HYALINE CASTS UR QL AUTO: ABNORMAL /LPF
IMM GRANULOCYTES # BLD: 0.05 10*3/MM3 (ref 0–0.03)
IMM GRANULOCYTES NFR BLD: 0.4 % (ref 0–0.5)
KETONES UR QL STRIP: NEGATIVE
LEUKOCYTE ESTERASE UR QL STRIP.AUTO: ABNORMAL
LYMPHOCYTES # BLD AUTO: 2.03 10*3/MM3 (ref 0.9–4.8)
LYMPHOCYTES NFR BLD AUTO: 17.5 % (ref 19.6–45.3)
MCH RBC QN AUTO: 27.7 PG (ref 26.9–32)
MCHC RBC AUTO-ENTMCNC: 32.3 G/DL (ref 32.4–36.3)
MCV RBC AUTO: 85.7 FL (ref 80.5–98.2)
MONOCYTES # BLD AUTO: 0.95 10*3/MM3 (ref 0.2–1.2)
MONOCYTES NFR BLD AUTO: 8.2 % (ref 5–12)
NEUTROPHILS # BLD AUTO: 8.32 10*3/MM3 (ref 1.9–8.1)
NEUTROPHILS NFR BLD AUTO: 71.9 % (ref 42.7–76)
NITRITE UR QL STRIP: NEGATIVE
PH UR STRIP.AUTO: 6 [PH] (ref 5–8)
PLATELET # BLD AUTO: 284 10*3/MM3 (ref 140–500)
PMV BLD AUTO: 11.2 FL (ref 6–12)
POC ALBUMIN: ABNORMAL
POTASSIUM BLD-SCNC: 3.6 MMOL/L (ref 3.5–5.2)
PROT SERPL-MCNC: 6.3 G/DL (ref 6–8.5)
PROT UR QL STRIP: ABNORMAL
RBC # BLD AUTO: 4.55 10*6/MM3 (ref 3.9–5.2)
RBC # UR: ABNORMAL /HPF
REF LAB TEST METHOD: ABNORMAL
SODIUM BLD-SCNC: 135 MMOL/L (ref 136–145)
SP GR UR STRIP: >=1.03 (ref 1–1.03)
SQUAMOUS #/AREA URNS HPF: ABNORMAL /HPF
UROBILINOGEN UR QL STRIP: ABNORMAL
WBC NRBC COR # BLD: 11.58 10*3/MM3 (ref 4.5–10.7)
WBC UR QL AUTO: ABNORMAL /HPF

## 2017-06-10 PROCEDURE — 81001 URINALYSIS AUTO W/SCOPE: CPT | Performed by: OBSTETRICS & GYNECOLOGY

## 2017-06-10 PROCEDURE — 80053 COMPREHEN METABOLIC PANEL: CPT | Performed by: OBSTETRICS & GYNECOLOGY

## 2017-06-10 PROCEDURE — G0378 HOSPITAL OBSERVATION PER HR: HCPCS

## 2017-06-10 PROCEDURE — 87086 URINE CULTURE/COLONY COUNT: CPT | Performed by: OBSTETRICS & GYNECOLOGY

## 2017-06-10 PROCEDURE — 85025 COMPLETE CBC W/AUTO DIFF WBC: CPT | Performed by: OBSTETRICS & GYNECOLOGY

## 2017-06-11 ENCOUNTER — PREP FOR SURGERY (OUTPATIENT)
Dept: OTHER | Facility: HOSPITAL | Age: 27
End: 2017-06-11

## 2017-06-11 DIAGNOSIS — O14.03: Primary | ICD-10-CM

## 2017-06-11 PROBLEM — O99.820 GROUP B STREPTOCOCCUS CARRIER, +RV CULTURE, CURRENTLY PREGNANT: Status: ACTIVE | Noted: 2017-06-11

## 2017-06-11 LAB — BACTERIA SPEC AEROBE CULT: NORMAL

## 2017-06-11 RX ORDER — ONDANSETRON 4 MG/1
4 TABLET, ORALLY DISINTEGRATING ORAL EVERY 6 HOURS PRN
Status: CANCELLED | OUTPATIENT
Start: 2017-06-11

## 2017-06-11 RX ORDER — FAMOTIDINE 10 MG
20 TABLET ORAL 2 TIMES DAILY
Status: CANCELLED | OUTPATIENT
Start: 2017-06-11

## 2017-06-11 RX ORDER — TERBUTALINE SULFATE 1 MG/ML
0.25 INJECTION, SOLUTION SUBCUTANEOUS AS NEEDED
Status: CANCELLED | OUTPATIENT
Start: 2017-06-11

## 2017-06-11 RX ORDER — DIPHENHYDRAMINE HYDROCHLORIDE 50 MG/ML
25 INJECTION INTRAMUSCULAR; INTRAVENOUS NIGHTLY PRN
Status: CANCELLED | OUTPATIENT
Start: 2017-06-11

## 2017-06-11 RX ORDER — ACETAMINOPHEN 325 MG/1
650 TABLET ORAL EVERY 4 HOURS PRN
Status: CANCELLED | OUTPATIENT
Start: 2017-06-11

## 2017-06-11 RX ORDER — BUTORPHANOL TARTRATE 1 MG/ML
2 INJECTION, SOLUTION INTRAMUSCULAR; INTRAVENOUS
Status: CANCELLED | OUTPATIENT
Start: 2017-06-11

## 2017-06-11 RX ORDER — LIDOCAINE HYDROCHLORIDE 10 MG/ML
5 INJECTION, SOLUTION INFILTRATION; PERINEURAL AS NEEDED
Status: CANCELLED | OUTPATIENT
Start: 2017-06-11

## 2017-06-11 RX ORDER — ONDANSETRON 2 MG/ML
4 INJECTION INTRAMUSCULAR; INTRAVENOUS EVERY 6 HOURS PRN
Status: CANCELLED | OUTPATIENT
Start: 2017-06-11

## 2017-06-11 RX ORDER — CARBOPROST TROMETHAMINE 250 UG/ML
250 INJECTION, SOLUTION INTRAMUSCULAR AS NEEDED
Status: CANCELLED | OUTPATIENT
Start: 2017-06-11

## 2017-06-11 RX ORDER — ONDANSETRON 4 MG/1
4 TABLET, FILM COATED ORAL EVERY 6 HOURS PRN
Status: CANCELLED | OUTPATIENT
Start: 2017-06-11

## 2017-06-11 RX ORDER — FAMOTIDINE 10 MG/ML
20 INJECTION, SOLUTION INTRAVENOUS 2 TIMES DAILY
Status: CANCELLED | OUTPATIENT
Start: 2017-06-11

## 2017-06-11 RX ORDER — HYDROCODONE BITARTRATE AND ACETAMINOPHEN 5; 325 MG/1; MG/1
1 TABLET ORAL EVERY 4 HOURS PRN
Status: CANCELLED | OUTPATIENT
Start: 2017-06-11 | End: 2017-06-21

## 2017-06-11 RX ORDER — BUTORPHANOL TARTRATE 1 MG/ML
1 INJECTION, SOLUTION INTRAMUSCULAR; INTRAVENOUS
Status: CANCELLED | OUTPATIENT
Start: 2017-06-11

## 2017-06-11 RX ORDER — HYDROCODONE BITARTRATE AND ACETAMINOPHEN 5; 325 MG/1; MG/1
2 TABLET ORAL EVERY 4 HOURS PRN
Status: CANCELLED | OUTPATIENT
Start: 2017-06-11 | End: 2017-06-21

## 2017-06-11 RX ORDER — SODIUM CHLORIDE 0.9 % (FLUSH) 0.9 %
1-10 SYRINGE (ML) INJECTION AS NEEDED
Status: CANCELLED | OUTPATIENT
Start: 2017-06-11

## 2017-06-11 RX ORDER — SODIUM CHLORIDE, SODIUM LACTATE, POTASSIUM CHLORIDE, CALCIUM CHLORIDE 600; 310; 30; 20 MG/100ML; MG/100ML; MG/100ML; MG/100ML
125 INJECTION, SOLUTION INTRAVENOUS CONTINUOUS
Status: CANCELLED | OUTPATIENT
Start: 2017-06-11

## 2017-06-11 RX ORDER — MISOPROSTOL 100 UG/1
25 TABLET ORAL
Status: CANCELLED | OUTPATIENT
Start: 2017-06-11 | End: 2017-06-12

## 2017-06-11 RX ORDER — MISOPROSTOL 100 UG/1
800 TABLET ORAL AS NEEDED
Status: CANCELLED | OUTPATIENT
Start: 2017-06-11

## 2017-06-11 RX ORDER — OXYTOCIN 10 [USP'U]/ML
2 INJECTION, SOLUTION INTRAMUSCULAR; INTRAVENOUS CONTINUOUS PRN
Status: CANCELLED | OUTPATIENT
Start: 2017-06-11 | End: 2017-06-12

## 2017-06-11 RX ORDER — OXYTOCIN 10 [USP'U]/ML
2 INJECTION, SOLUTION INTRAMUSCULAR; INTRAVENOUS ONCE
Status: CANCELLED | OUTPATIENT
Start: 2017-06-11 | End: 2017-06-11

## 2017-06-11 RX ORDER — DIPHENHYDRAMINE HCL 25 MG
25 CAPSULE ORAL NIGHTLY PRN
Status: CANCELLED | OUTPATIENT
Start: 2017-06-11

## 2017-06-12 ENCOUNTER — HOSPITAL ENCOUNTER (OUTPATIENT)
Dept: LABOR AND DELIVERY | Facility: HOSPITAL | Age: 27
Discharge: HOME | End: 2017-06-12

## 2017-06-12 ENCOUNTER — ANESTHESIA EVENT (OUTPATIENT)
Dept: LABOR AND DELIVERY | Facility: HOSPITAL | Age: 27
End: 2017-06-12

## 2017-06-12 ENCOUNTER — ANESTHESIA (OUTPATIENT)
Dept: LABOR AND DELIVERY | Facility: HOSPITAL | Age: 27
End: 2017-06-12

## 2017-06-12 ENCOUNTER — HOSPITAL ENCOUNTER (INPATIENT)
Facility: HOSPITAL | Age: 27
LOS: 2 days | Discharge: HOME | End: 2017-06-14
Attending: OBSTETRICS & GYNECOLOGY | Admitting: OBSTETRICS & GYNECOLOGY

## 2017-06-12 DIAGNOSIS — J45.909 ASTHMA AFFECTING PREGNANCY, ANTEPARTUM: Primary | ICD-10-CM

## 2017-06-12 DIAGNOSIS — O14.03: ICD-10-CM

## 2017-06-12 DIAGNOSIS — O99.519 ASTHMA AFFECTING PREGNANCY, ANTEPARTUM: Primary | ICD-10-CM

## 2017-06-12 PROBLEM — O14.00 PRE-ECLAMPSIA, MILD, ANTEPARTUM: Status: ACTIVE | Noted: 2017-06-12

## 2017-06-12 PROBLEM — Z34.90 PREGNANCY: Status: RESOLVED | Noted: 2017-06-05 | Resolved: 2017-06-12

## 2017-06-12 LAB
ABO GROUP BLD: NORMAL
ALBUMIN SERPL-MCNC: 3.4 G/DL (ref 3.5–5.2)
ALBUMIN/GLOB SERPL: 1.1 G/DL
ALP SERPL-CCNC: 121 U/L (ref 39–117)
ALT SERPL W P-5'-P-CCNC: 15 U/L (ref 1–33)
ANION GAP SERPL CALCULATED.3IONS-SCNC: 13.6 MMOL/L
AST SERPL-CCNC: 15 U/L (ref 1–32)
ATMOSPHERIC PRESS: 747.5 MMHG
BASE EXCESS BLDCOV CALC-SCNC: -6 MMOL/L (ref -30–30)
BASOPHILS # BLD AUTO: 0.02 10*3/MM3 (ref 0–0.2)
BASOPHILS NFR BLD AUTO: 0.2 % (ref 0–1.5)
BDY SITE: ABNORMAL
BILIRUB SERPL-MCNC: <0.2 MG/DL (ref 0.1–1.2)
BLD GP AB SCN SERPL QL: NEGATIVE
BUN BLD-MCNC: 6 MG/DL (ref 6–20)
BUN/CREAT SERPL: 10.9 (ref 7–25)
CALCIUM SPEC-SCNC: 9.1 MG/DL (ref 8.6–10.5)
CHLORIDE SERPL-SCNC: 101 MMOL/L (ref 98–107)
CO2 SERPL-SCNC: 22.4 MMOL/L (ref 22–29)
CREAT BLD-MCNC: 0.55 MG/DL (ref 0.57–1)
DEPRECATED RDW RBC AUTO: 44.2 FL (ref 37–54)
EOSINOPHIL # BLD AUTO: 0.2 10*3/MM3 (ref 0–0.7)
EOSINOPHIL NFR BLD AUTO: 1.7 % (ref 0.3–6.2)
ERYTHROCYTE [DISTWIDTH] IN BLOOD BY AUTOMATED COUNT: 14.3 % (ref 11.7–13)
GFR SERPL CREATININE-BSD FRML MDRD: 133 ML/MIN/1.73
GLOBULIN UR ELPH-MCNC: 3.2 GM/DL
GLUCOSE BLD-MCNC: 78 MG/DL (ref 65–99)
HCO3 BLDCOV-SCNC: 22.4 MMOL/L
HCT VFR BLD AUTO: 39.5 % (ref 35.6–45.5)
HGB BLD-MCNC: 13.1 G/DL (ref 11.9–15.5)
IMM GRANULOCYTES # BLD: 0.06 10*3/MM3 (ref 0–0.03)
IMM GRANULOCYTES NFR BLD: 0.5 % (ref 0–0.5)
LYMPHOCYTES # BLD AUTO: 2.74 10*3/MM3 (ref 0.9–4.8)
LYMPHOCYTES NFR BLD AUTO: 23.3 % (ref 19.6–45.3)
MCH RBC QN AUTO: 28.7 PG (ref 26.9–32)
MCHC RBC AUTO-ENTMCNC: 33.2 G/DL (ref 32.4–36.3)
MCV RBC AUTO: 86.4 FL (ref 80.5–98.2)
MODALITY: ABNORMAL
MONOCYTES # BLD AUTO: 1.13 10*3/MM3 (ref 0.2–1.2)
MONOCYTES NFR BLD AUTO: 9.6 % (ref 5–12)
NEUTROPHILS # BLD AUTO: 7.63 10*3/MM3 (ref 1.9–8.1)
NEUTROPHILS NFR BLD AUTO: 64.7 % (ref 42.7–76)
PCO2 BLDCOV: 53.7 MM HG (ref 35–51.3)
PH BLDCOV: 7.23 [PH] (ref 7.26–7.4)
PLATELET # BLD AUTO: 290 10*3/MM3 (ref 140–500)
PMV BLD AUTO: 10.7 FL (ref 6–12)
PO2 BLDCOV: 24.6 MM HG (ref 19–39)
POTASSIUM BLD-SCNC: 4.1 MMOL/L (ref 3.5–5.2)
PROT SERPL-MCNC: 6.6 G/DL (ref 6–8.5)
RBC # BLD AUTO: 4.57 10*6/MM3 (ref 3.9–5.2)
RH BLD: POSITIVE
SAO2 % BLDCOA: 33.3 % (ref 92–99)
SAO2 % BLDCOV: ABNORMAL %
SODIUM BLD-SCNC: 137 MMOL/L (ref 136–145)
WBC NRBC COR # BLD: 11.78 10*3/MM3 (ref 4.5–10.7)

## 2017-06-12 PROCEDURE — 86850 RBC ANTIBODY SCREEN: CPT | Performed by: OBSTETRICS & GYNECOLOGY

## 2017-06-12 PROCEDURE — 85025 COMPLETE CBC W/AUTO DIFF WBC: CPT | Performed by: OBSTETRICS & GYNECOLOGY

## 2017-06-12 PROCEDURE — 3E03329 INTRODUCTION OF OTHER ANTI-INFECTIVE INTO PERIPHERAL VEIN, PERCUTANEOUS APPROACH: ICD-10-PCS | Performed by: OBSTETRICS & GYNECOLOGY

## 2017-06-12 PROCEDURE — 59400 OBSTETRICAL CARE: CPT | Performed by: OBSTETRICS & GYNECOLOGY

## 2017-06-12 PROCEDURE — 86900 BLOOD TYPING SEROLOGIC ABO: CPT | Performed by: OBSTETRICS & GYNECOLOGY

## 2017-06-12 PROCEDURE — C1755 CATHETER, INTRASPINAL: HCPCS | Performed by: ANESTHESIOLOGY

## 2017-06-12 PROCEDURE — 10907ZC DRAINAGE OF AMNIOTIC FLUID, THERAPEUTIC FROM PRODUCTS OF CONCEPTION, VIA NATURAL OR ARTIFICIAL OPENING: ICD-10-PCS | Performed by: OBSTETRICS & GYNECOLOGY

## 2017-06-12 PROCEDURE — 0HQ9XZZ REPAIR PERINEUM SKIN, EXTERNAL APPROACH: ICD-10-PCS | Performed by: OBSTETRICS & GYNECOLOGY

## 2017-06-12 PROCEDURE — 80053 COMPREHEN METABOLIC PANEL: CPT | Performed by: OBSTETRICS & GYNECOLOGY

## 2017-06-12 PROCEDURE — 82803 BLOOD GASES ANY COMBINATION: CPT

## 2017-06-12 PROCEDURE — 3E033VJ INTRODUCTION OF OTHER HORMONE INTO PERIPHERAL VEIN, PERCUTANEOUS APPROACH: ICD-10-PCS | Performed by: OBSTETRICS & GYNECOLOGY

## 2017-06-12 PROCEDURE — 25010000002 ONDANSETRON PER 1 MG: Performed by: OBSTETRICS & GYNECOLOGY

## 2017-06-12 PROCEDURE — 25010000002 PENICILLIN G POTASSIUM PER 600000 UNITS: Performed by: OBSTETRICS & GYNECOLOGY

## 2017-06-12 PROCEDURE — 86901 BLOOD TYPING SEROLOGIC RH(D): CPT | Performed by: OBSTETRICS & GYNECOLOGY

## 2017-06-12 RX ORDER — CARBOPROST TROMETHAMINE 250 UG/ML
250 INJECTION, SOLUTION INTRAMUSCULAR AS NEEDED
Status: DISCONTINUED | OUTPATIENT
Start: 2017-06-12 | End: 2017-06-12 | Stop reason: HOSPADM

## 2017-06-12 RX ORDER — IBUPROFEN 800 MG/1
800 TABLET ORAL EVERY 8 HOURS SCHEDULED
Status: DISCONTINUED | OUTPATIENT
Start: 2017-06-12 | End: 2017-06-14 | Stop reason: HOSPADM

## 2017-06-12 RX ORDER — DIPHENHYDRAMINE HCL 25 MG
25 CAPSULE ORAL NIGHTLY PRN
Status: DISCONTINUED | OUTPATIENT
Start: 2017-06-12 | End: 2017-06-12 | Stop reason: HOSPADM

## 2017-06-12 RX ORDER — TERBUTALINE SULFATE 1 MG/ML
0.25 INJECTION, SOLUTION SUBCUTANEOUS AS NEEDED
Status: DISCONTINUED | OUTPATIENT
Start: 2017-06-12 | End: 2017-06-12 | Stop reason: HOSPADM

## 2017-06-12 RX ORDER — ONDANSETRON 4 MG/1
4 TABLET, ORALLY DISINTEGRATING ORAL EVERY 6 HOURS PRN
Status: DISCONTINUED | OUTPATIENT
Start: 2017-06-12 | End: 2017-06-12 | Stop reason: HOSPADM

## 2017-06-12 RX ORDER — ONDANSETRON 4 MG/1
4 TABLET, FILM COATED ORAL EVERY 6 HOURS PRN
Status: DISCONTINUED | OUTPATIENT
Start: 2017-06-12 | End: 2017-06-12 | Stop reason: HOSPADM

## 2017-06-12 RX ORDER — ACETAMINOPHEN 325 MG/1
650 TABLET ORAL EVERY 4 HOURS PRN
Status: DISCONTINUED | OUTPATIENT
Start: 2017-06-12 | End: 2017-06-12 | Stop reason: HOSPADM

## 2017-06-12 RX ORDER — ALBUTEROL SULFATE 90 UG/1
2 AEROSOL, METERED RESPIRATORY (INHALATION) EVERY 6 HOURS PRN
Status: DISCONTINUED | OUTPATIENT
Start: 2017-06-12 | End: 2017-06-12 | Stop reason: CLARIF

## 2017-06-12 RX ORDER — SODIUM CHLORIDE 0.9 % (FLUSH) 0.9 %
1-10 SYRINGE (ML) INJECTION AS NEEDED
Status: DISCONTINUED | OUTPATIENT
Start: 2017-06-12 | End: 2017-06-14 | Stop reason: HOSPADM

## 2017-06-12 RX ORDER — PENICILLIN G 3000000 [IU]/50ML
3 INJECTION, SOLUTION INTRAVENOUS EVERY 4 HOURS
Status: DISCONTINUED | OUTPATIENT
Start: 2017-06-12 | End: 2017-06-12 | Stop reason: HOSPADM

## 2017-06-12 RX ORDER — SODIUM CHLORIDE 0.9 % (FLUSH) 0.9 %
1-10 SYRINGE (ML) INJECTION AS NEEDED
Status: DISCONTINUED | OUTPATIENT
Start: 2017-06-12 | End: 2017-06-12 | Stop reason: HOSPADM

## 2017-06-12 RX ORDER — ACETAMINOPHEN 325 MG/1
650 TABLET ORAL EVERY 4 HOURS PRN
Status: DISCONTINUED | OUTPATIENT
Start: 2017-06-12 | End: 2017-06-14 | Stop reason: HOSPADM

## 2017-06-12 RX ORDER — LIDOCAINE HYDROCHLORIDE AND EPINEPHRINE 15; 5 MG/ML; UG/ML
INJECTION, SOLUTION EPIDURAL AS NEEDED
Status: DISCONTINUED | OUTPATIENT
Start: 2017-06-12 | End: 2017-06-12 | Stop reason: SURG

## 2017-06-12 RX ORDER — LANOLIN 100 %
OINTMENT (GRAM) TOPICAL
Status: DISCONTINUED | OUTPATIENT
Start: 2017-06-12 | End: 2017-06-14 | Stop reason: HOSPADM

## 2017-06-12 RX ORDER — LIDOCAINE HYDROCHLORIDE 10 MG/ML
5 INJECTION, SOLUTION INFILTRATION; PERINEURAL AS NEEDED
Status: DISCONTINUED | OUTPATIENT
Start: 2017-06-12 | End: 2017-06-12 | Stop reason: HOSPADM

## 2017-06-12 RX ORDER — MISOPROSTOL 100 MCG
25 TABLET ORAL
Status: DISPENSED | OUTPATIENT
Start: 2017-06-12 | End: 2017-06-12

## 2017-06-12 RX ORDER — MISOPROSTOL 200 UG/1
800 TABLET ORAL AS NEEDED
Status: DISCONTINUED | OUTPATIENT
Start: 2017-06-12 | End: 2017-06-12 | Stop reason: HOSPADM

## 2017-06-12 RX ORDER — ONDANSETRON 2 MG/ML
4 INJECTION INTRAMUSCULAR; INTRAVENOUS ONCE AS NEEDED
Status: DISCONTINUED | OUTPATIENT
Start: 2017-06-12 | End: 2017-06-12 | Stop reason: HOSPADM

## 2017-06-12 RX ORDER — FAMOTIDINE 10 MG/ML
20 INJECTION, SOLUTION INTRAVENOUS 2 TIMES DAILY
Status: DISCONTINUED | OUTPATIENT
Start: 2017-06-12 | End: 2017-06-12 | Stop reason: HOSPADM

## 2017-06-12 RX ORDER — ERYTHROMYCIN 5 MG/G
OINTMENT OPHTHALMIC
Status: DISPENSED
Start: 2017-06-12 | End: 2017-06-13

## 2017-06-12 RX ORDER — OXYTOCIN/RINGER'S LACTATE 10/500ML
2 PLASTIC BAG, INJECTION (ML) INTRAVENOUS ONCE
Status: COMPLETED | OUTPATIENT
Start: 2017-06-12 | End: 2017-06-12

## 2017-06-12 RX ORDER — ONDANSETRON 2 MG/ML
4 INJECTION INTRAMUSCULAR; INTRAVENOUS EVERY 6 HOURS PRN
Status: DISCONTINUED | OUTPATIENT
Start: 2017-06-12 | End: 2017-06-14 | Stop reason: HOSPADM

## 2017-06-12 RX ORDER — DOCUSATE SODIUM 100 MG/1
100 CAPSULE, LIQUID FILLED ORAL 2 TIMES DAILY
Status: DISCONTINUED | OUTPATIENT
Start: 2017-06-13 | End: 2017-06-14 | Stop reason: HOSPADM

## 2017-06-12 RX ORDER — BUTORPHANOL TARTRATE 1 MG/ML
1 INJECTION, SOLUTION INTRAMUSCULAR; INTRAVENOUS
Status: DISCONTINUED | OUTPATIENT
Start: 2017-06-12 | End: 2017-06-12 | Stop reason: HOSPADM

## 2017-06-12 RX ORDER — BISACODYL 10 MG
10 SUPPOSITORY, RECTAL RECTAL DAILY PRN
Status: DISCONTINUED | OUTPATIENT
Start: 2017-06-13 | End: 2017-06-14 | Stop reason: HOSPADM

## 2017-06-12 RX ORDER — CALCIUM CARBONATE 200(500)MG
2 TABLET,CHEWABLE ORAL 3 TIMES DAILY PRN
Status: DISCONTINUED | OUTPATIENT
Start: 2017-06-12 | End: 2017-06-14 | Stop reason: HOSPADM

## 2017-06-12 RX ORDER — PHYTONADIONE 1 MG/.5ML
INJECTION, EMULSION INTRAMUSCULAR; INTRAVENOUS; SUBCUTANEOUS
Status: DISPENSED
Start: 2017-06-12 | End: 2017-06-13

## 2017-06-12 RX ORDER — DIPHENHYDRAMINE HYDROCHLORIDE 50 MG/ML
12.5 INJECTION INTRAMUSCULAR; INTRAVENOUS EVERY 8 HOURS PRN
Status: DISCONTINUED | OUTPATIENT
Start: 2017-06-12 | End: 2017-06-12 | Stop reason: HOSPADM

## 2017-06-12 RX ORDER — HYDROCODONE BITARTRATE AND ACETAMINOPHEN 5; 325 MG/1; MG/1
2 TABLET ORAL EVERY 4 HOURS PRN
Status: DISCONTINUED | OUTPATIENT
Start: 2017-06-12 | End: 2017-06-12 | Stop reason: HOSPADM

## 2017-06-12 RX ORDER — OXYTOCIN/RINGER'S LACTATE 10/500ML
2-30 PLASTIC BAG, INJECTION (ML) INTRAVENOUS
Status: DISCONTINUED | OUTPATIENT
Start: 2017-06-12 | End: 2017-06-14

## 2017-06-12 RX ORDER — BUDESONIDE AND FORMOTEROL FUMARATE DIHYDRATE 80; 4.5 UG/1; UG/1
2 AEROSOL RESPIRATORY (INHALATION)
Status: DISCONTINUED | OUTPATIENT
Start: 2017-06-12 | End: 2017-06-14 | Stop reason: HOSPADM

## 2017-06-12 RX ORDER — OXYTOCIN/RINGER'S LACTATE 10/500ML
2 PLASTIC BAG, INJECTION (ML) INTRAVENOUS CONTINUOUS PRN
Status: DISCONTINUED | OUTPATIENT
Start: 2017-06-12 | End: 2017-06-12 | Stop reason: HOSPADM

## 2017-06-12 RX ORDER — ONDANSETRON 2 MG/ML
4 INJECTION INTRAMUSCULAR; INTRAVENOUS EVERY 6 HOURS PRN
Status: DISCONTINUED | OUTPATIENT
Start: 2017-06-12 | End: 2017-06-12 | Stop reason: HOSPADM

## 2017-06-12 RX ORDER — FAMOTIDINE 10 MG/ML
20 INJECTION, SOLUTION INTRAVENOUS ONCE AS NEEDED
Status: COMPLETED | OUTPATIENT
Start: 2017-06-12 | End: 2017-06-12

## 2017-06-12 RX ORDER — DIPHENHYDRAMINE HYDROCHLORIDE 50 MG/ML
25 INJECTION INTRAMUSCULAR; INTRAVENOUS NIGHTLY PRN
Status: DISCONTINUED | OUTPATIENT
Start: 2017-06-12 | End: 2017-06-12 | Stop reason: HOSPADM

## 2017-06-12 RX ORDER — HYDROCODONE BITARTRATE AND ACETAMINOPHEN 5; 325 MG/1; MG/1
1 TABLET ORAL EVERY 4 HOURS PRN
Status: DISCONTINUED | OUTPATIENT
Start: 2017-06-12 | End: 2017-06-12 | Stop reason: HOSPADM

## 2017-06-12 RX ORDER — OXYTOCIN/RINGER'S LACTATE 10/500ML
999 PLASTIC BAG, INJECTION (ML) INTRAVENOUS ONCE
Status: DISCONTINUED | OUTPATIENT
Start: 2017-06-12 | End: 2017-06-14 | Stop reason: HOSPADM

## 2017-06-12 RX ORDER — METHYLERGONOVINE MALEATE 0.2 MG/ML
200 INJECTION INTRAVENOUS ONCE
Status: DISCONTINUED | OUTPATIENT
Start: 2017-06-12 | End: 2017-06-14 | Stop reason: HOSPADM

## 2017-06-12 RX ORDER — OXYTOCIN/RINGER'S LACTATE 10/500ML
125 PLASTIC BAG, INJECTION (ML) INTRAVENOUS CONTINUOUS PRN
Status: ACTIVE | OUTPATIENT
Start: 2017-06-12 | End: 2017-06-13

## 2017-06-12 RX ORDER — ALBUTEROL SULFATE 2.5 MG/3ML
2.5 SOLUTION RESPIRATORY (INHALATION) EVERY 6 HOURS PRN
Status: DISCONTINUED | OUTPATIENT
Start: 2017-06-12 | End: 2017-06-14 | Stop reason: HOSPADM

## 2017-06-12 RX ORDER — EPHEDRINE SULFATE 50 MG/ML
5 INJECTION, SOLUTION INTRAVENOUS AS NEEDED
Status: DISCONTINUED | OUTPATIENT
Start: 2017-06-12 | End: 2017-06-12 | Stop reason: HOSPADM

## 2017-06-12 RX ORDER — PRENATAL VIT NO.126/IRON/FOLIC 28MG-0.8MG
1 TABLET ORAL DAILY
Status: DISCONTINUED | OUTPATIENT
Start: 2017-06-13 | End: 2017-06-14 | Stop reason: HOSPADM

## 2017-06-12 RX ORDER — FAMOTIDINE 20 MG/1
20 TABLET, FILM COATED ORAL 2 TIMES DAILY
Status: DISCONTINUED | OUTPATIENT
Start: 2017-06-12 | End: 2017-06-12 | Stop reason: HOSPADM

## 2017-06-12 RX ORDER — SODIUM CHLORIDE, SODIUM LACTATE, POTASSIUM CHLORIDE, CALCIUM CHLORIDE 600; 310; 30; 20 MG/100ML; MG/100ML; MG/100ML; MG/100ML
125 INJECTION, SOLUTION INTRAVENOUS CONTINUOUS
Status: DISCONTINUED | OUTPATIENT
Start: 2017-06-12 | End: 2017-06-14

## 2017-06-12 RX ADMIN — Medication 96 ML/HR: at 13:04

## 2017-06-12 RX ADMIN — ONDANSETRON 4 MG: 2 INJECTION INTRAMUSCULAR; INTRAVENOUS at 11:13

## 2017-06-12 RX ADMIN — Medication 2 TABLET: at 23:34

## 2017-06-12 RX ADMIN — ACETAMINOPHEN 650 MG: 325 TABLET ORAL at 04:25

## 2017-06-12 RX ADMIN — EPHEDRINE SULFATE 5 MG: 50 INJECTION INTRAMUSCULAR; INTRAVENOUS; SUBCUTANEOUS at 15:32

## 2017-06-12 RX ADMIN — SODIUM CHLORIDE, POTASSIUM CHLORIDE, SODIUM LACTATE AND CALCIUM CHLORIDE 125 ML/HR: 600; 310; 30; 20 INJECTION, SOLUTION INTRAVENOUS at 15:32

## 2017-06-12 RX ADMIN — OXYTOCIN 333 MILLI-UNITS/MIN: 10 INJECTION, SOLUTION INTRAMUSCULAR; INTRAVENOUS at 21:02

## 2017-06-12 RX ADMIN — PENICILLIN G 3 MILLION UNITS: 3000000 INJECTION, SOLUTION INTRAVENOUS at 17:52

## 2017-06-12 RX ADMIN — LIDOCAINE HYDROCHLORIDE AND EPINEPHRINE 4 ML: 15; 5 INJECTION, SOLUTION EPIDURAL at 13:03

## 2017-06-12 RX ADMIN — PENICILLIN G 3 MILLION UNITS: 3000000 INJECTION, SOLUTION INTRAVENOUS at 13:42

## 2017-06-12 RX ADMIN — MISOPROSTOL 25 MCG: 100 TABLET ORAL at 01:50

## 2017-06-12 RX ADMIN — PENICILLIN G POTASSIUM 5 MILLION UNITS: 5000000 POWDER, FOR SOLUTION INTRAMUSCULAR; INTRAPLEURAL; INTRATHECAL; INTRAVENOUS at 01:45

## 2017-06-12 RX ADMIN — MISOPROSTOL 25 MCG: 100 TABLET ORAL at 05:35

## 2017-06-12 RX ADMIN — OXYTOCIN 41.67 MILLI-UNITS/MIN: 10 INJECTION, SOLUTION INTRAMUSCULAR; INTRAVENOUS at 21:32

## 2017-06-12 RX ADMIN — EPHEDRINE SULFATE 5 MG: 50 INJECTION INTRAMUSCULAR; INTRAVENOUS; SUBCUTANEOUS at 15:37

## 2017-06-12 RX ADMIN — PENICILLIN G 3 MILLION UNITS: 3000000 INJECTION, SOLUTION INTRAVENOUS at 05:30

## 2017-06-12 RX ADMIN — BENZOCAINE AND MENTHOL: 20; .5 SPRAY TOPICAL at 23:30

## 2017-06-12 RX ADMIN — FAMOTIDINE 20 MG: 10 INJECTION, SOLUTION INTRAVENOUS at 19:30

## 2017-06-12 RX ADMIN — SODIUM CHLORIDE, POTASSIUM CHLORIDE, SODIUM LACTATE AND CALCIUM CHLORIDE 125 ML/HR: 600; 310; 30; 20 INJECTION, SOLUTION INTRAVENOUS at 19:16

## 2017-06-12 RX ADMIN — IBUPROFEN 800 MG: 800 TABLET ORAL at 23:25

## 2017-06-12 RX ADMIN — SODIUM CHLORIDE, POTASSIUM CHLORIDE, SODIUM LACTATE AND CALCIUM CHLORIDE 125 ML/HR: 600; 310; 30; 20 INJECTION, SOLUTION INTRAVENOUS at 08:26

## 2017-06-12 RX ADMIN — PENICILLIN G 3 MILLION UNITS: 3000000 INJECTION, SOLUTION INTRAVENOUS at 09:46

## 2017-06-12 RX ADMIN — SODIUM CHLORIDE, POTASSIUM CHLORIDE, SODIUM LACTATE AND CALCIUM CHLORIDE 125 ML/HR: 600; 310; 30; 20 INJECTION, SOLUTION INTRAVENOUS at 01:10

## 2017-06-12 NOTE — PLAN OF CARE
Problem: Patient Care Overview (Adult)  Goal: Plan of Care Review  Outcome: Ongoing (interventions implemented as appropriate)    06/12/17 0628   Coping/Psychosocial Response Interventions   Plan Of Care Reviewed With patient;spouse   Patient Care Overview   Progress progress toward functional goals as expected       Goal: Adult Individualization and Mutuality  Outcome: Ongoing (interventions implemented as appropriate)    06/12/17 0628   Individualization   Patient Specific Preferences CALLIE; breastfeeding   Patient Specific Goals dad to cut cord; lamaze breathing    Patient Specific Interventions NCB; induction/cervical ripening   Mutuality/Individual Preferences   What Anxieties, Fears or Concerns Do You Have About Your Health or Care? n/a   What Questions Do You Have About Your Health or Care? n/a   What Information Would Help Us Give You More Personalized Care? n/a        Goal: Discharge Needs Assessment  Outcome: Ongoing (interventions implemented as appropriate)    06/12/17 0628   Discharge Needs Assessment   Concerns To Be Addressed no discharge needs identified         Problem: Labor (Cervical Ripen, Induct, Augment) (Adult,Obstetrics,Pediatric)  Goal: Signs and Symptoms of Listed Potential Problems Will be Absent or Manageable (Labor)  Outcome: Ongoing (interventions implemented as appropriate)    06/12/17 0628   Labor (Cervical Ripen, Induct, Augment)   Problems Assessed (Labor) all   Problems Present (Labor) none

## 2017-06-12 NOTE — ANESTHESIA PREPROCEDURE EVALUATION
Anesthesia Evaluation     Patient summary reviewed   no history of anesthetic complications:  NPO Solid Status: > 6 hours       Airway   Mallampati: III  TM distance: >3 FB  Neck ROM: full  no difficulty expected  Dental - normal exam     Pulmonary - normal exam   (+) asthma,   Cardiovascular - normal exam    (-) angina      Neuro/Psych  GI/Hepatic/Renal/Endo    (+) obesity,      Musculoskeletal     Abdominal    Substance History      OB/GYN    (+) Pregnant, pregnancy induced hypertension        Other        (-) blood dyscrasia  ROS/Med Hx Other: Hb 13.1  Plts 290K                                  Anesthesia Plan    ASA 3     epidural     Anesthetic plan and risks discussed with patient.

## 2017-06-12 NOTE — PROGRESS NOTES
Patient comfortable.    Vitals:    17 1712 17 1720 17 1735 17 1750   BP:  117/61 128/81 130/69   BP Location: Left arm      Patient Position: Lying      Pulse:  90 100 112   Resp: 16      Temp: 97.7 °F (36.5 °C)      TempSrc: Oral      SpO2:       Weight:       Height:           Cervix: Complete, complete, 0 station    NST: 140s/reactive/moderate variability/no decelerations  Tocometry: Contractions every 2-4 minutes    Assessment:  1.  27-year-old  1 at 37-6/7 weeks gestational age in active labor  2.  Preeclampsia without severe features  3.  GBS positive  4.  Fetal heart tones category 1    Plan:  1.  Patient had received 2 doses of Cytotec and has progressed well on normal primiparous labor curve without further augmentation.  We will continue expectant management at this time.  Anticipate vaginal delivery.  Continue penicillin through delivery.

## 2017-06-12 NOTE — H&P
CC: Labor induction  History present illness: Patient presents for labor induction secondary to preeclampsia without severe features.  Her blood pressures have been persistently in the mild hypertensive range during the last 3-4 weeks of the pregnancy.  She had 24-hour urine protein collection greater than 300 mg.  Patient has not had any signs or symptoms of severe preeclampsia.  She is in her usual state of health today.  She denies headache or visual changes.  Good fetal movement.    Past Medical History:   Diagnosis Date   • Anxiety    • Asthma    • Chronic sinus infection    • Eczema    • Gestational hypertension      Past Surgical History:   Procedure Laterality Date   • WISDOM TOOTH EXTRACTION       Family History   Problem Relation Age of Onset   • Heart attack Father    • Diabetes Father    • Hypertension Mother    • Diabetes Paternal Grandmother    • Heart disease Maternal Grandmother    • Lung cancer Maternal Grandfather      Social History   Substance Use Topics   • Smoking status: Never Smoker   • Smokeless tobacco: Never Used   • Alcohol use No      Comment: not during pregnancy     No current facility-administered medications on file prior to encounter.      Current Outpatient Prescriptions on File Prior to Encounter   Medication Sig   • Albuterol Sulfate (VENTOLIN HFA IN) Inhale.   • Prenatal MV-Min-Fe Fum-FA-DHA (PRENATAL 1 PO) Take  by mouth.   • raNITIdine (ZANTAC) 150 MG tablet Take 150 mg by mouth 2 (Two) Times a Day.   • budesonide-formoterol (SYMBICORT) 80-4.5 MCG/ACT inhaler Inhale 2 puffs 2 (Two) Times a Day.       No Known Allergies     ROS:  General: No fever or chills  Constitutional: No weight loss or gain, no hair loss  HENT: No headache, no hearing loss, no tinnitus  Eyes: normal vision, no eye pain  Lungs: No cough, no shortness of breath  Heart: No chest pain, no palpitations  Abdomen: No nausea, vomiting, constipation or diarrhea  : No dysuria, no hematuria  Skin: No  rashes  Lymph: No swelling  Neuro: No parathesia, no weakness  Psych: Normal though content, no hallucinations, no SI/HI    PE:  Vitals:    17 0545 17 0645 17 0715 17 0730   BP: 149/78 140/88 151/84    BP Location:       Patient Position:       Pulse: 66 98 54    Resp:    18   Temp:    97.8 °F (36.6 °C)   TempSrc:    Oral   SpO2:       Weight:       Height:       Current /62  General: No acute distress, awake and oriented ×3  Lungs: Clear to auscultation bilaterally  Cardiovascular: Regular rate and rhythm  Abdomen: Soft, nontender, nondistended, normoactive bowel sounds, EFW 6.5 pounds  Cvx: 1.5 cm/80%/-2 soft, mid position. S/P AROM with cler fluid. IUPC and FSE placed. WOrking well  Extremities: No Tenderness, no Homans sign, 1+ lower extremity edema      Results from last 7 days  Lab Units 17  0116 06/10/17  0553 17  0005 17  1416   SODIUM mmol/L 137 135* 139 137   BUN mg/dL 6 11 9 7   CREATININE mg/dL 0.55* 0.63 0.57 0.62   GLUCOSE mg/dL 78 102* 104*  --          Estimated Creatinine Clearance: 214.7 mL/min (by C-G formula based on Cr of 0.55).    Results from last 7 days  Lab Units 17  0116 06/10/17  0553 17  0005 17  1416   WBC 10*3/mm3 11.78* 11.58* 13.04* 12.16*   HEMOGLOBIN g/dL 13.1 12.6 12.9 13.8   PLATELETS 10*3/mm3 290 284 268 316           Results from last 7 days  Lab Units 17  0116 06/10/17  0553 17  0005 17  1416   ALT (SGPT) U/L 15 15 25 21   AST (SGOT) U/L 15 13 22 23     GBS pos    NST: 135/reactive/moderate variability/no decelerations  Tocometry: Contractions difficult to monitor secondary to body habitus.  Patient reports contractions of intensity about 4/10 occurring every few minutes.    Assessment:  1.  27-year-old  1 at 37-6/7 weeks gestational age with preeclampsia without severe features  2.  Asthma, complicating pregnancy, stable  3.  Fetal heart tones category 1  4.  GBS negative    Plan:  1.   Patient's been counseled extensively about management options of continued expectant management of pregnancy versus labor induction now that she has reached 37 weeks of gestation.  Recommendations from the ACOG hypertensive task force call for delivery at 37 weeks of gestation in the setting of preeclampsia.  The risks, benefits, and alternatives have been discussed with the patient including the risks of iatrogenic prematurity and the risks of  section.  It is felt that at this gestational age the risks of expectant management outweigh the risks of delivery.  The patient verbalizes understanding and wishes to proceed with labor induction.  She has received 2 doses of Cytotec and now has a favorable cervix.  We will start Pitocin if necessary.  Patient may have an epidural upon request.  Patient currently without signs of severe preeclampsia; therefore, no indication for magnesium at this time.  We will start magnesium for seizure prophylaxis in the signs of severe preeclampsia.  Continue penicillin for GBS until delivery.  Plan of care discussed at length with the patient.  All questions answered.

## 2017-06-12 NOTE — ANESTHESIA PROCEDURE NOTES
Labor Epidural    Patient location during procedure: OB  Performed By  Anesthesiologist: TAYLER ABREU  Preanesthetic Checklist  Completed: patient identified, site marked, surgical consent, pre-op evaluation, timeout performed, IV checked, risks and benefits discussed and monitors and equipment checked  Epidural Block Prep:  Pt Position:sitting  Sterile Tech:gloves and sterile barrier  Prep:chlorhexidine gluconate and isopropyl alcohol  Monitoring:blood pressure monitoring, continuous pulse oximetry and EKG  Epidural Block Procedure:  Approach:midline  Guidance:palpation technique  Location:L2-L3  Needle Type:Tuohy  Needle Gauge:17  Loss of Resistance Medium: saline  Loss of Resistance: 7cm  Cath Depth at skin:13 cm  Paresthesia: none  Aspiration:negative  Test Dose:negative  Number of Attempts: 1  Post Assessment:  Dressing:occlusive dressing applied and secured with tape  Pt Tolerance:patient tolerated the procedure well with no apparent complications

## 2017-06-13 LAB
BASOPHILS # BLD AUTO: 0.01 10*3/MM3 (ref 0–0.2)
BASOPHILS NFR BLD AUTO: 0.1 % (ref 0–1.5)
DEPRECATED RDW RBC AUTO: 45.4 FL (ref 37–54)
EOSINOPHIL # BLD AUTO: 0.04 10*3/MM3 (ref 0–0.7)
EOSINOPHIL NFR BLD AUTO: 0.2 % (ref 0.3–6.2)
ERYTHROCYTE [DISTWIDTH] IN BLOOD BY AUTOMATED COUNT: 14.6 % (ref 11.7–13)
HCT VFR BLD AUTO: 36.3 % (ref 35.6–45.5)
HGB BLD-MCNC: 12.1 G/DL (ref 11.9–15.5)
IMM GRANULOCYTES # BLD: 0.07 10*3/MM3 (ref 0–0.03)
IMM GRANULOCYTES NFR BLD: 0.4 % (ref 0–0.5)
LYMPHOCYTES # BLD AUTO: 2.09 10*3/MM3 (ref 0.9–4.8)
LYMPHOCYTES NFR BLD AUTO: 11.9 % (ref 19.6–45.3)
MCH RBC QN AUTO: 28.7 PG (ref 26.9–32)
MCHC RBC AUTO-ENTMCNC: 33.3 G/DL (ref 32.4–36.3)
MCV RBC AUTO: 86 FL (ref 80.5–98.2)
MONOCYTES # BLD AUTO: 1.3 10*3/MM3 (ref 0.2–1.2)
MONOCYTES NFR BLD AUTO: 7.4 % (ref 5–12)
NEUTROPHILS # BLD AUTO: 14.01 10*3/MM3 (ref 1.9–8.1)
NEUTROPHILS NFR BLD AUTO: 80 % (ref 42.7–76)
PLATELET # BLD AUTO: 244 10*3/MM3 (ref 140–500)
PMV BLD AUTO: 10.4 FL (ref 6–12)
RBC # BLD AUTO: 4.22 10*6/MM3 (ref 3.9–5.2)
WBC NRBC COR # BLD: 17.52 10*3/MM3 (ref 4.5–10.7)

## 2017-06-13 PROCEDURE — 85025 COMPLETE CBC W/AUTO DIFF WBC: CPT | Performed by: OBSTETRICS & GYNECOLOGY

## 2017-06-13 RX ORDER — HYDROCODONE BITARTRATE AND ACETAMINOPHEN 5; 325 MG/1; MG/1
1 TABLET ORAL EVERY 4 HOURS PRN
Status: DISCONTINUED | OUTPATIENT
Start: 2017-06-13 | End: 2017-06-14 | Stop reason: HOSPADM

## 2017-06-13 RX ORDER — HYDROCODONE BITARTRATE AND ACETAMINOPHEN 5; 325 MG/1; MG/1
2 TABLET ORAL EVERY 4 HOURS PRN
Status: DISCONTINUED | OUTPATIENT
Start: 2017-06-13 | End: 2017-06-13

## 2017-06-13 RX ORDER — HYDROCODONE BITARTRATE AND ACETAMINOPHEN 5; 325 MG/1; MG/1
1 TABLET ORAL EVERY 4 HOURS PRN
Status: DISCONTINUED | OUTPATIENT
Start: 2017-06-13 | End: 2017-06-13

## 2017-06-13 RX ORDER — HYDROCODONE BITARTRATE AND ACETAMINOPHEN 5; 325 MG/1; MG/1
2 TABLET ORAL EVERY 4 HOURS PRN
Status: DISCONTINUED | OUTPATIENT
Start: 2017-06-13 | End: 2017-06-14 | Stop reason: HOSPADM

## 2017-06-13 RX ADMIN — Medication 1 TABLET: at 08:33

## 2017-06-13 RX ADMIN — DOCUSATE SODIUM 100 MG: 100 CAPSULE, LIQUID FILLED ORAL at 08:33

## 2017-06-13 RX ADMIN — IBUPROFEN 800 MG: 800 TABLET ORAL at 16:26

## 2017-06-13 RX ADMIN — DOCUSATE SODIUM 100 MG: 100 CAPSULE, LIQUID FILLED ORAL at 16:26

## 2017-06-13 RX ADMIN — IBUPROFEN 800 MG: 800 TABLET ORAL at 08:33

## 2017-06-13 RX ADMIN — Medication 1 APPLICATION: at 08:34

## 2017-06-13 NOTE — PLAN OF CARE
Problem: Patient Care Overview (Adult)  Goal: Plan of Care Review  Outcome: Ongoing (interventions implemented as appropriate)    06/12/17 2301   Coping/Psychosocial Response Interventions   Plan Of Care Reviewed With patient;spouse   Patient Care Overview   Progress improving   Outcome Evaluation   Outcome Summary/Follow up Plan s/p vaginal delivery, stable for transfer to mother/baby       Goal: Adult Individualization and Mutuality  Outcome: Ongoing (interventions implemented as appropriate)    06/12/17 0628 06/12/17 2301   Individualization   Patient Specific Preferences CALLIE; breastfeeding --    Patient Specific Goals --  healthy baby   Patient Specific Interventions --  epidural for comfort   Mutuality/Individual Preferences   What Anxieties, Fears or Concerns Do You Have About Your Health or Care? --  denies   What Questions Do You Have About Your Health or Care? --  denies   What Information Would Help Us Give You More Personalized Care? --  help with & education breastfeeding       Goal: Discharge Needs Assessment  Outcome: Ongoing (interventions implemented as appropriate)    06/12/17 0104 06/12/17 0106 06/12/17 0628   Discharge Needs Assessment   Concerns To Be Addressed --  --  no discharge needs identified   Equipment Needed After Discharge none --  --    Living Environment   Transportation Available car --  --    Self-Care   Equipment Currently Used at Home --  none --          Problem: Labor (Cervical Ripen, Induct, Augment) (Adult,Obstetrics,Pediatric)  Goal: Signs and Symptoms of Listed Potential Problems Will be Absent or Manageable (Labor)  Outcome: Ongoing (interventions implemented as appropriate)    06/12/17 2301   Labor (Cervical Ripen, Induct, Augment)   Problems Assessed (Labor) all   Problems Present (Labor) none         Problem: Anesthesia/Analgesia, Neuraxial (Obstetrics)  Goal: Signs and Symptoms of Listed Potential Problems Will be Absent or Manageable (Anesthesia/Analgesia,  Neuraxial)  Outcome: Ongoing (interventions implemented as appropriate)    17   Anesthesia/Analgesia, Neuraxial   Problems Assessed (Neuraxial Anesthesia/Analgesia, OB) all   Problems Present (Neuraxial Anesthesia/Analgesia, OB) none         Problem: Fall Risk  (Adult,Obstetrics,Pediatric)  Goal: Identify Related Risk Factors and Signs and Symptoms  Outcome: Ongoing (interventions implemented as appropriate)    17   Fall Risk    Fall Risk: Related Risk Factors regional anesthesia    Fall Risk: Signs and Symptoms presence of fall risk factors;increased risk of  fall/drop       Goal: Absence of Maternal Fall  Outcome: Ongoing (interventions implemented as appropriate)    17   Fall Risk  (Adult,Obstetrics,Pediatric)   Absence of Maternal Fall achieves outcome       Goal: Absence of [place] Fall/Drop  Outcome: Ongoing (interventions implemented as appropriate)    17   Fall Risk  (Adult,Obstetrics,Pediatric)   Absence of  Fall/Drop achieves outcome

## 2017-06-13 NOTE — PROGRESS NOTES
Postpartum Progress Note      Status post Vaginal Delivery: Doing well postoperatively.     Preeclampsia without severe features--bps have been normal since delivery.  Patient asymptomatic.  Continue to monitor.    Rh status: B positive  Rubella: Immune  Gender: male--desires circumcision.  Nursing asked that circ be held due to poor feeding and low temperature    Subjective     Postpartum Day 1: Vaginal delivery    The patient feels well. The patient denies emotional concerns. Pain is well controlled with current medications. The baby iswell. The patient is ambulating well. The patient is tolerating a normal diet.     Objective     Vital signs in last 24 hours:  Temp:  [97.5 °F (36.4 °C)-99.6 °F (37.6 °C)] 97.9 °F (36.6 °C)  Heart Rate:  [] 94  Resp:  [16-18] 18  BP: ()/(48-94) 129/89      General:    alert, appears stated age and cooperative   Abdomen:  Soft, Non-tender    Lochia:  appropriate   Uterine Fundus:   firm   Ext    Edema trace   DVT Evaluation:  No evidence of DVT seen on physical exam.     Lab Results   Component Value Date    WBC 17.52 (H) 06/13/2017    HGB 12.1 06/13/2017    HCT 36.3 06/13/2017    MCV 86.0 06/13/2017     06/13/2017       Dalila Somers MD  6/13/2017  8:46 AM

## 2017-06-13 NOTE — LACTATION NOTE
This note was copied from a baby's chart.  P1. Baby Boy is 37w6d and has some bruising and scrapes on his head along with moulding. Mom is having difficulty handling baby and positioning him at breast. Nipples are flat and dense and LC was unable to express colostrum. Baby placed in CALLIE , Needs lots of teaching and reassurance.

## 2017-06-13 NOTE — L&D DELIVERY NOTE
Procedure: Spontaneous vaginal delivery    Surgeon: Robin Mendez MD    Preop diagnosis: 27 year old G1 at 37-6/7 weeks gestational age in active labor  2.  Preeclampsia without severe features  3.  GBS positive    Postop diagnosis: Same    Indications: Patient was admitted for labor induction at 37-6/7 weeks gestational age secondary to preeclampsia without severe features.  She initially presented with a cervical exam of 0.5 cm dilated and 50% effaced.  She received 2 doses of Cytotec.  After her second dose of Cytotec, she was 1-2 centimeters dilated and 70% effaced.  Artificial rupture membranes was performed.  From that point, the patient progressed along a normal primiparous labor curve without any further augmentation.  She received an epidural.  Fetal heart tones were reassuring throughout labor.  She was GBS positive and she received several doses of penicillin while in labor.  By roughly 8 PM, she was clearly dilated, completely effaced, +2 station and ready to begin pushing.    Findings: Male infant, Apgar 8/9, Weight 6 lbs. 4 oz.; first-degree perineal laceration; loose nuchal cord ×1    Anesthesia: Epidural    EBL: 300 mL    Pathology: Placenta    Cord gases: Collected, but pending at the time of dictation    Complications: None    Procedure: I came to the room when the cervical exam was completely dilated, completely effaced, and +3 station. Under continuous external fetal heart rate monitoring, the patient was encouraged to push. With good maternal effort she delivered a viable male infant. The head presented in the occiput anterior presentation and restituted to right occiput transverse. There was a single loose nuchal cord present. The left anterior fetal shoulder was then easily delivered with a gentle downward motion followed by the posterior fetal shoulder, followed by the remainder of the infant without difficulty. The infant was immediately vigorous. The oropharynx and nares were bulb-suction  and the cord was clamped. The cord was cut in between and the infant was handed to the nursery attendants.  Cord gases were collected. Cord blood was then collected. The placenta then delivered spontaneously intact, and a three vessel cord was noted. Uterine message and pitocin 20 units IV was given until the fundus was firm. The cervix, vagina, perineum, and rectum were carefully inspected for lacerations and a first-degree perineal laceration was noted.  This was repaired in a typical fashion using 2-0 Vicryl Rapide. Counts for needles, sponges, laps and instruments were correct times two at the end of the delivery. There were no sponges left in the vagina. I was present and scrubbed for the entire delivery. There were no major complications. Mother and baby were bonding well at the end of the delivery.

## 2017-06-13 NOTE — PLAN OF CARE
Problem: Patient Care Overview (Adult)  Goal: Plan of Care Review  Outcome: Ongoing (interventions implemented as appropriate)    06/13/17 0347   Coping/Psychosocial Response Interventions   Plan Of Care Reviewed With patient   Patient Care Overview   Progress improving   Outcome Evaluation   Outcome Summary/Follow up Plan Pt's pain controlled, VSS,        Goal: Adult Individualization and Mutuality  Outcome: Ongoing (interventions implemented as appropriate)  Goal: Discharge Needs Assessment  Outcome: Ongoing (interventions implemented as appropriate)    Problem: Postpartum, Vaginal Delivery (Adult)  Goal: Signs and Symptoms of Listed Potential Problems Will be Absent or Manageable (Postpartum, Vaginal Delivery)  Outcome: Ongoing (interventions implemented as appropriate)

## 2017-06-14 VITALS
TEMPERATURE: 98.1 F | HEART RATE: 69 BPM | OXYGEN SATURATION: 98 % | HEIGHT: 69 IN | WEIGHT: 270 LBS | RESPIRATION RATE: 16 BRPM | DIASTOLIC BLOOD PRESSURE: 77 MMHG | SYSTOLIC BLOOD PRESSURE: 139 MMHG | BODY MASS INDEX: 39.99 KG/M2

## 2017-06-14 PROCEDURE — 90791 PSYCH DIAGNOSTIC EVALUATION: CPT | Performed by: SOCIAL WORKER

## 2017-06-14 RX ORDER — HYDROCODONE BITARTRATE AND ACETAMINOPHEN 5; 325 MG/1; MG/1
1 TABLET ORAL EVERY 4 HOURS PRN
Qty: 15 TABLET | Refills: 0 | Status: SHIPPED | OUTPATIENT
Start: 2017-06-14 | End: 2017-06-23

## 2017-06-14 RX ADMIN — HYDROCODONE BITARTRATE AND ACETAMINOPHEN 1 TABLET: 5; 325 TABLET ORAL at 15:06

## 2017-06-14 RX ADMIN — HYDROCODONE BITARTRATE AND ACETAMINOPHEN 1 TABLET: 5; 325 TABLET ORAL at 01:38

## 2017-06-14 RX ADMIN — BENZOCAINE AND MENTHOL: 20; .5 SPRAY TOPICAL at 15:09

## 2017-06-14 RX ADMIN — DOCUSATE SODIUM 100 MG: 100 CAPSULE, LIQUID FILLED ORAL at 08:14

## 2017-06-14 RX ADMIN — Medication 1 TABLET: at 08:14

## 2017-06-14 RX ADMIN — IBUPROFEN 800 MG: 800 TABLET ORAL at 01:38

## 2017-06-14 RX ADMIN — IBUPROFEN 800 MG: 800 TABLET ORAL at 13:37

## 2017-06-14 NOTE — LACTATION NOTE
This note was copied from a baby's chart.  P1. Mom to move to 308 to board . Baby latched to right breast with nipple shield and a nutritive suckle. Demonstrated breast compression and baby responded with swallows.  Mom is diligent with pumping.
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
## 2017-06-14 NOTE — PROGRESS NOTES
"Subjective:  Postpartum Day 2:     The patient feels well.  Pain is well controlled with current medications. The baby is doing well and making excellent progress in NICU.  Urinary output is adequate. The patient is ambulating well. The patient is tolerating a normal diet. Flatus has been passed.      Objective:    Vital signs in last 24 hours:  Blood pressure 126/80, pulse 60, temperature 98 °F (36.7 °C), temperature source Oral, resp. rate 16, height 69\" (175.3 cm), weight 270 lb (122 kg), last menstrual period 09/13/2016, SpO2 98 %, currently breastfeeding.      General:    alert, appears stated age and cooperative   Uterine Fundus:   firm     Labs    WBC   Date Value Ref Range Status   06/13/2017 17.52 (H) 4.50 - 10.70 10*3/mm3 Final     RBC   Date Value Ref Range Status   06/13/2017 4.22 3.90 - 5.20 10*6/mm3 Final     Hemoglobin   Date Value Ref Range Status   06/13/2017 12.1 11.9 - 15.5 g/dL Final     Hematocrit   Date Value Ref Range Status   06/13/2017 36.3 35.6 - 45.5 % Final     MCV   Date Value Ref Range Status   06/13/2017 86.0 80.5 - 98.2 fL Final     MCH   Date Value Ref Range Status   06/13/2017 28.7 26.9 - 32.0 pg Final     MCHC   Date Value Ref Range Status   06/13/2017 33.3 32.4 - 36.3 g/dL Final     RDW   Date Value Ref Range Status   06/13/2017 14.6 (H) 11.7 - 13.0 % Final     RDW-SD   Date Value Ref Range Status   06/13/2017 45.4 37.0 - 54.0 fl Final     MPV   Date Value Ref Range Status   06/13/2017 10.4 6.0 - 12.0 fL Final     Platelets   Date Value Ref Range Status   06/13/2017 244 140 - 500 10*3/mm3 Final     Neutrophil %   Date Value Ref Range Status   06/13/2017 80.0 (H) 42.7 - 76.0 % Final     Lymphocyte %   Date Value Ref Range Status   06/13/2017 11.9 (L) 19.6 - 45.3 % Final     Monocyte %   Date Value Ref Range Status   06/13/2017 7.4 5.0 - 12.0 % Final     Eosinophil %   Date Value Ref Range Status   06/13/2017 0.2 (L) 0.3 - 6.2 % Final     Basophil %   Date Value Ref Range Status "   06/13/2017 0.1 0.0 - 1.5 % Final     Immature Grans %   Date Value Ref Range Status   06/13/2017 0.4 0.0 - 0.5 % Final     Neutrophils, Absolute   Date Value Ref Range Status   06/13/2017 14.01 (H) 1.90 - 8.10 10*3/mm3 Final     Lymphocytes, Absolute   Date Value Ref Range Status   06/13/2017 2.09 0.90 - 4.80 10*3/mm3 Final     Monocytes, Absolute   Date Value Ref Range Status   06/13/2017 1.30 (H) 0.20 - 1.20 10*3/mm3 Final     Eosinophils, Absolute   Date Value Ref Range Status   06/13/2017 0.04 0.00 - 0.70 10*3/mm3 Final     Basophils, Absolute   Date Value Ref Range Status   06/13/2017 0.01 0.00 - 0.20 10*3/mm3 Final     Immature Grans, Absolute   Date Value Ref Range Status   06/13/2017 0.07 (H) 0.00 - 0.03 10*3/mm3 Final     Rh +    Assessment/Plan:.     Postpartum Day #2  - Patient to be discharged home and reviewed instructions.  - Holding circumcision.  Infant still in NICU.      Shane Long MD

## 2017-06-14 NOTE — PLAN OF CARE
Problem: Patient Care Overview (Adult)  Goal: Plan of Care Review  Outcome: Ongoing (interventions implemented as appropriate)    17 0509   Coping/Psychosocial Response Interventions   Plan Of Care Reviewed With patient   Patient Care Overview   Progress improving   Outcome Evaluation   Outcome Summary/Follow up Plan V/S stable, pain controlled with pain meds, light rubra, infant in NICU, using EBP, plan is to D/C  today, would like to board if possible       Goal: Adult Individualization and Mutuality  Outcome: Ongoing (interventions implemented as appropriate)  Goal: Discharge Needs Assessment  Outcome: Ongoing (interventions implemented as appropriate)    Problem: Postpartum, Vaginal Delivery (Adult)  Goal: Signs and Symptoms of Listed Potential Problems Will be Absent or Manageable (Postpartum, Vaginal Delivery)  Outcome: Ongoing (interventions implemented as appropriate)    Problem: Breastfeeding (Adult,NICU,[place],Obstetrics,Pediatric)  Goal: Signs and Symptoms of Listed Potential Problems Will be Absent or Manageable (Breastfeeding)  Outcome: Ongoing (interventions implemented as appropriate)
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
## 2017-07-26 ENCOUNTER — TELEPHONE (OUTPATIENT)
Dept: OBSTETRICS AND GYNECOLOGY | Facility: CLINIC | Age: 27
End: 2017-07-26

## 2017-07-26 ENCOUNTER — POSTPARTUM VISIT (OUTPATIENT)
Dept: OBSTETRICS AND GYNECOLOGY | Facility: CLINIC | Age: 27
End: 2017-07-26

## 2017-07-26 VITALS
BODY MASS INDEX: 37.47 KG/M2 | HEART RATE: 91 BPM | DIASTOLIC BLOOD PRESSURE: 66 MMHG | HEIGHT: 69 IN | WEIGHT: 253 LBS | SYSTOLIC BLOOD PRESSURE: 113 MMHG

## 2017-07-26 DIAGNOSIS — Z30.09 BIRTH CONTROL COUNSELING: ICD-10-CM

## 2017-07-26 PROBLEM — O14.00 PRE-ECLAMPSIA, MILD, ANTEPARTUM: Status: RESOLVED | Noted: 2017-06-12 | Resolved: 2017-07-26

## 2017-07-26 PROBLEM — Z23 NEED FOR TDAP VACCINATION: Status: RESOLVED | Noted: 2017-04-05 | Resolved: 2017-07-26

## 2017-07-26 PROBLEM — O14.03 MILD PRE-ECLAMPSIA IN THIRD TRIMESTER: Status: RESOLVED | Noted: 2017-05-17 | Resolved: 2017-07-26

## 2017-07-26 PROBLEM — O99.820 GROUP B STREPTOCOCCUS CARRIER, +RV CULTURE, CURRENTLY PREGNANT: Status: RESOLVED | Noted: 2017-06-11 | Resolved: 2017-07-26

## 2017-07-26 PROCEDURE — 0503F POSTPARTUM CARE VISIT: CPT | Performed by: OBSTETRICS & GYNECOLOGY

## 2017-07-26 NOTE — PROGRESS NOTES
"Subjective   Melva Dunlap is a 27 y.o. female.   CC: Pt here for 6wk pp.   History of Present Illness  Pt here for 6wk pp.  she is without major complaints.  She has some occasional mild headaches.  She is still pumping breast milk.  She recently returned to work yesterday.  She has not been sexually active since delivery.  She has not had a period since delivery.  She denies signs or symptoms of postpartum depression.  The patient would like to proceed with the Mirena IUD as her method of birth control.  Last Pap was in 2016 and was normal.    The following portions of the patient's history were reviewed and updated as appropriate: allergies, current medications, past family history, past medical history, past social history, past surgical history and problem list.    Review of Systems  General: No fever or chills  Constitutional: No weight loss or gain, no hair loss  HENT: No headache, no hearing loss, no tinnitus  Eyes: normal vision, no eye pain  Lungs: No cough, no shortness of breath  Heart: No chest pain, no palpitations  Abdomen: No nausea, vomiting, constipation or diarrhea  : No dysuria, no hematuria  Skin: No rashes  Lymph: No swelling  Neuro: No parathesia, no weakness  Psych: Normal though content, no hallucinations, no SI/HI    Objective   Physical Exam  Vitals:    17 1531   BP: 113/66   Pulse: 91   Weight: 253 lb (115 kg)   Height: 69\" (175.3 cm)   Gen: No acute distress, awake and oriented times three  Abdomen: soft, nontender, non distended, normoactive bowel sounds  Pelvic:   Normal external female genitalia, no lesions  Vagina: No blood or discharge  Cervix: No cervical motion tenderness, no lesions, no active bleeding, nonfriable  Uterus: Anteverted, normal size and shape, nontender  Adnexa: No masses or tenderness  Rectal: Deferred  Psych: Good judgement and insight, normal affect and mood    Assessment:  1.  27-year-old  1 para 16 weeks status post uncomplicated vaginal " delivery for postpartum exam  2.  Birth control counseling    Plan:    Patient may resume normal activities.  We discussed various contraceptive options at length.  The risks, benefits, and alternatives were discussed.  The patient like to proceed with Mirena.  She was given educational handouts on Mirena.  Side effects were discussed.  We will obtain prior authorization and have the patient return for Mirena insertion.  She is encouraged to use condoms if she is to be sexually active before then.  She verbalizes understanding.

## 2017-08-04 ENCOUNTER — TELEPHONE (OUTPATIENT)
Dept: OBSTETRICS AND GYNECOLOGY | Facility: CLINIC | Age: 27
End: 2017-08-04

## 2017-09-13 ENCOUNTER — PROCEDURE VISIT (OUTPATIENT)
Dept: OBSTETRICS AND GYNECOLOGY | Facility: CLINIC | Age: 27
End: 2017-09-13

## 2017-09-13 VITALS
SYSTOLIC BLOOD PRESSURE: 135 MMHG | HEART RATE: 79 BPM | DIASTOLIC BLOOD PRESSURE: 86 MMHG | HEIGHT: 69 IN | BODY MASS INDEX: 37.33 KG/M2 | WEIGHT: 252 LBS

## 2017-09-13 DIAGNOSIS — Z30.430 ENCOUNTER FOR IUD INSERTION: Primary | ICD-10-CM

## 2017-09-13 PROCEDURE — 81025 URINE PREGNANCY TEST: CPT | Performed by: OBSTETRICS & GYNECOLOGY

## 2017-09-13 PROCEDURE — 58300 INSERT INTRAUTERINE DEVICE: CPT | Performed by: OBSTETRICS & GYNECOLOGY

## 2017-09-13 NOTE — PROGRESS NOTES
Procedure   Procedures   Pt here for Mirena insertion.    Procedure: Mirena Intrauterine device insertion  Preoperative diagnosis: Encounter for contraception with intrauterine device  Postoperative diagnosis: Encounter for contraception with intrauterine device  Indications: Patient elects for Mirena IUD for contraception.  She is counseled extensively the risks, benefits, alternatives, and side effects.  All her questions are answered and she provides verbal and written consent to proceed.  Urine pregnancy test in the office is negative today.  She is currently breast-feeding and is amenorrheic.   Findings: Uterus normal size and shape, anteverted, about 8 weeks' size  Anesthesia: None  Pathology: None  Estimated blood loss: Less than 5 mL  Complications: None    The risks, benefits, and alternatives to Mirena were explained at length with the patient. All her questions were answered and consents were signed.  The patient was placed in a dorsal lithotomy position on the examining table in [facility]. A bimanual exam confirmed the uterus was normal in size, anteverted, and midplane. A warmed metal speculum was inserted into the vagina and the cervix was brought into view. The cervix was prepped with Betadine. The anterior lip was grasped with a single-tooth tenaculum. The endometrial cavity was then sounded to 7.5 cm without use of a dilator. Gloves were then exchanged for sterile gloves. This sealed Mirena package was opened and the Mirena was removed in a sterile fashion.  The upper edge of the depth setting the flange was set at a uterine sound measured. The  was then carefully advanced to the cervical canal into the uterus to the level of the fundus. This was then backed off about 1.5-2 cm to allow sufficient space for the arms to open. The slider was then retracted about 1 cm and deployed the device. The device was then gently advanced to the fundus. The Mirena was then released by pulling the slider  down all the way. The  was removed carefully from the uterus. The threads were then cut leaving 2-3 cm visible outside of the cervix.  The single-tooth tenaculum was removed from the anterior lip. Silver nitrate was applied to the tenaculum sites. Good hemostasis was noted. All other instruments were removed from the vagina. There were no complications, and the patient tolerated the procedure well with a minimal amount of discomfort. The patient was counseled about the need to return in 4 weeks for string check. She was counseled about the need to use a backup method of contraception such as condoms until her post insertion exam was performed. The patient verbalized understanding that the Mirena will need to be removed/replaced after 5 years. The patient is counseled to contact us if she has any significant or increasing bleeding, pain, fever, chills, or other concerns. She is instructed to see a doctor right away if she believes that she may be pregnant at any time with the Mirena in place.

## 2017-09-18 LAB
B-HCG UR QL: NEGATIVE
INTERNAL NEGATIVE CONTROL: NEGATIVE
INTERNAL POSITIVE CONTROL: POSITIVE
Lab: NORMAL

## 2017-10-12 ENCOUNTER — OFFICE VISIT (OUTPATIENT)
Dept: OBSTETRICS AND GYNECOLOGY | Facility: CLINIC | Age: 27
End: 2017-10-12

## 2017-10-12 VITALS
WEIGHT: 245 LBS | HEART RATE: 80 BPM | DIASTOLIC BLOOD PRESSURE: 80 MMHG | HEIGHT: 69 IN | BODY MASS INDEX: 36.29 KG/M2 | SYSTOLIC BLOOD PRESSURE: 126 MMHG

## 2017-10-12 DIAGNOSIS — Z30.431 IUD CHECK UP: Primary | ICD-10-CM

## 2017-10-12 PROBLEM — Z30.09 BIRTH CONTROL COUNSELING: Status: RESOLVED | Noted: 2017-07-26 | Resolved: 2017-10-12

## 2017-10-12 PROBLEM — Z30.430 ENCOUNTER FOR IUD INSERTION: Status: RESOLVED | Noted: 2017-09-13 | Resolved: 2017-10-12

## 2017-10-12 PROCEDURE — 99212 OFFICE O/P EST SF 10 MIN: CPT | Performed by: OBSTETRICS & GYNECOLOGY

## 2017-10-12 NOTE — PROGRESS NOTES
"Subjective   Melva Dunlap is a 27 y.o. female   CC: IUD check    History of Present Illness  Patient is here for checkup of her Mirena IUD which was inserted about 1 month ago.  She does not have any major complaints with the IUD.  She has a light amount of vaginal bleeding.  Denies any significant pain.  She has been sexually active since insertion and has not had any issues.  She is otherwise doing well today.      The following portions of the patient's history were reviewed and updated as appropriate: allergies, current medications, past family history, past medical history, past social history, past surgical history and problem list.    Review of Systems  General: No fever or chills  Constitutional: No weight loss or gain, no hair loss  Abdomen: No nausea, vomiting, constipation or diarrhea  : No dysuria, no hematuria  Psych: Normal though content, no hallucinations, no SI/HI    Objective   Physical Exam  Vitals:    10/12/17 1457   BP: 126/80   Pulse: 80   Weight: 245 lb (111 kg)   Height: 69\" (175.3 cm)   Gen: No acute distress, awake and oriented times three  Abdomen: soft, nontender, non distended, normoactive bowel sounds  Pelvic:   Normal external female genitalia, no lesions  Vagina: No blood or discharge  Cervix: No cervical motion tenderness, no lesions, no active bleeding, nonfriable, IUD strings seen at os  Bimanual: Deferred  Psych: Good judgement and insight, normal affect and mood      Assessment/Plan   Melva was seen today for follow-up.    Diagnoses and all orders for this visit:    IUD check up        IUD in normal location.  We discussed typical bleeding pattern with Mirena IUD.  All questions answered.  Return to the office around July 2018 for annual exam or sooner as needed.           "

## 2018-06-14 ENCOUNTER — PROCEDURE VISIT (OUTPATIENT)
Dept: OBSTETRICS AND GYNECOLOGY | Facility: CLINIC | Age: 28
End: 2018-06-14

## 2018-06-14 ENCOUNTER — OFFICE VISIT (OUTPATIENT)
Dept: OBSTETRICS AND GYNECOLOGY | Facility: CLINIC | Age: 28
End: 2018-06-14

## 2018-06-14 VITALS
WEIGHT: 246 LBS | HEART RATE: 88 BPM | BODY MASS INDEX: 36.43 KG/M2 | SYSTOLIC BLOOD PRESSURE: 140 MMHG | DIASTOLIC BLOOD PRESSURE: 86 MMHG | HEIGHT: 69 IN

## 2018-06-14 DIAGNOSIS — Z01.419 ENCOUNTER FOR GYNECOLOGICAL EXAMINATION: Primary | ICD-10-CM

## 2018-06-14 DIAGNOSIS — Z97.5 IUD (INTRAUTERINE DEVICE) IN PLACE: Primary | ICD-10-CM

## 2018-06-14 DIAGNOSIS — T83.32XA INTRAUTERINE CONTRACEPTIVE DEVICE THREADS LOST, INITIAL ENCOUNTER: ICD-10-CM

## 2018-06-14 PROCEDURE — 76830 TRANSVAGINAL US NON-OB: CPT | Performed by: OBSTETRICS & GYNECOLOGY

## 2018-06-14 PROCEDURE — 99395 PREV VISIT EST AGE 18-39: CPT | Performed by: OBSTETRICS & GYNECOLOGY

## 2018-06-14 RX ORDER — CHLORCYCLIZINE HYDROCHLORIDE AND PSEUDOEPHEDRINE HYDROCHLORIDE 25; 60 MG/1; MG/1
TABLET ORAL
Refills: 0 | COMMUNITY
Start: 2018-06-01 | End: 2019-10-22

## 2018-06-14 RX ORDER — CETIRIZINE HYDROCHLORIDE 5 MG/1
5 TABLET ORAL
COMMUNITY
End: 2022-03-29

## 2018-06-14 RX ORDER — MONTELUKAST SODIUM 10 MG/1
10 TABLET ORAL
COMMUNITY

## 2018-06-14 NOTE — PROGRESS NOTES
"Subjective   Melva Dunlap is a 28 y.o. female.   CC: Pt here annual.  History of Present Illness   Pt here annual.  She has no gynecologic complaints today.  She does report that she still has some occasional light periods.  Denies pelvic pain.  She denies regular or discharge.  Last Pap smear was in 2016 and was normal.  She declines STD testing today.  She does not routinely perform self breast exams.  She has no family history of breast or colon cancer.  She is not a smoker.      Social History   Substance Use Topics   • Smoking status: Never Smoker   • Smokeless tobacco: Never Used   • Alcohol use Yes     The following portions of the patient's history were reviewed and updated as appropriate: allergies, current medications, past family history, past medical history, past social history, past surgical history and problem list.    Review of Systems  General: No fever or chills  Constitutional: No weight loss or gain, no hair loss  HENT: No headache, no hearing loss, no tinnitus  Eyes: normal vision, no eye pain  Lungs: Pos cough, no shortness of breath  Heart: No chest pain, no palpitations  Abdomen: No nausea, vomiting, constipation or diarrhea  : No dysuria, no hematuria  Skin: No rashes  Lymph: No swelling  Neuro: No parathesia, no weakness  Psych: Normal though content, no hallucinations, no SI/HI    Objective   Physical Exam  Vitals:    06/14/18 1439   BP: 140/86   Pulse: 88   Weight: 112 kg (246 lb)   Height: 175.3 cm (69\")   Exam performed in the presence of a female chaperone  Patient has provided verbal consent to proceed with exam.    Gen: No acute distress, awake and oriented times three  HENT: Normocephalic, atraumatic, Moist mucous membranes  Eyes: PERRLA, EOMI  Neck: Supple, normal range of motion, no thyromegaly  Lungs: Normal work of breathing, lungs clear bilaterally, no crackles/wheezes  Heart: Regular rate and rhythm, no murmurs  Abdomen: soft, nontender, non distended, normoactive bowel " sounds  Breast: Symmetrical. No skin changes or nipple retractions. No lumps or masses bilaterally. No tenderness bilaterally.  Pelvic:   Normal external female genitalia, no lesions  Vagina: No blood or discharge  Cervix: No cervical motion tenderness, no lesions, no active bleeding, nonfriable, IUD strings not seen  Uterus: Anteverted, normal size and shape, nontender  Adnexa: No masses or tenderness  Rectal: Deferred  Skin: Warm and dry, no rashes  Psych: Good judgement and insight, normal affect and mood  Neuro: CN 2-12 intact, no gross deficits    Assessment/Plan   Diagnoses and all orders for this visit:    Encounter for gynecological examination      Pap smear not indicated this year.  Patient declines STD testing.  Patient is encouraged to perform routine self breast exams.  We discussed annual screening mammograms beginning at age 40.  IUD strings were not seen on the visit today.  We will obtain an ultrasound to check for IUD location.  Patient's encouraged to use condoms as a backup method until she has her ultrasound performed.  Patient with a long-standing chronic cough for the last 4 weeks.  She is encouraged to follow up with her primary care physician to discuss this.    Addendum:  Patient returned later in the day for an ultrasound.  I have reviewed the ultrasound.  Ultrasound did confirm a normal location of the IUD within the uterus at the fundus.

## 2019-10-22 ENCOUNTER — OFFICE VISIT (OUTPATIENT)
Dept: OBSTETRICS AND GYNECOLOGY | Facility: CLINIC | Age: 29
End: 2019-10-22

## 2019-10-22 VITALS
WEIGHT: 238 LBS | DIASTOLIC BLOOD PRESSURE: 78 MMHG | BODY MASS INDEX: 35.25 KG/M2 | SYSTOLIC BLOOD PRESSURE: 132 MMHG | HEIGHT: 69 IN

## 2019-10-22 DIAGNOSIS — N89.8 VAGINAL DISCHARGE: ICD-10-CM

## 2019-10-22 DIAGNOSIS — Z01.419 ENCOUNTER FOR ANNUAL ROUTINE GYNECOLOGICAL EXAMINATION: ICD-10-CM

## 2019-10-22 DIAGNOSIS — Z11.3 SCREENING EXAMINATION FOR STD (SEXUALLY TRANSMITTED DISEASE): Primary | ICD-10-CM

## 2019-10-22 DIAGNOSIS — Z53.20 HIV SCREENING DECLINED: ICD-10-CM

## 2019-10-22 PROCEDURE — 99395 PREV VISIT EST AGE 18-39: CPT | Performed by: OBSTETRICS & GYNECOLOGY

## 2019-10-22 RX ORDER — LISDEXAMFETAMINE DIMESYLATE 50 MG
50 CAPSULE ORAL EVERY MORNING
Refills: 0 | COMMUNITY
Start: 2019-08-29

## 2019-10-22 RX ORDER — CETIRIZINE HYDROCHLORIDE 5 MG/1
5 TABLET ORAL DAILY
COMMUNITY
End: 2022-03-29

## 2019-10-22 RX ORDER — BUPROPION HYDROCHLORIDE 300 MG/1
300 TABLET ORAL DAILY
Refills: 5 | COMMUNITY
Start: 2019-10-12 | End: 2020-09-09

## 2019-10-22 RX ORDER — ESCITALOPRAM OXALATE 20 MG/1
20 TABLET ORAL EVERY MORNING
Refills: 1 | COMMUNITY
Start: 2019-08-29

## 2019-10-22 RX ORDER — AMLODIPINE BESYLATE 5 MG/1
5 TABLET ORAL DAILY
Refills: 1 | COMMUNITY
Start: 2019-09-17

## 2019-10-22 RX ORDER — ALBUTEROL SULFATE 90 UG/1
AEROSOL, METERED RESPIRATORY (INHALATION)
COMMUNITY
Start: 2019-08-07

## 2019-10-22 NOTE — PROGRESS NOTES
"[facility]  [address]  [place], [place] [address]  Phone: [phone] / Fax:  [phone]      10/22/2019    [address]  [facility] [place] [address]    Pam Rivas MD    Chief Complaint   Patient presents with   • Gynecologic Exam     Annual Exam, last pap 7-18-16 nl.. Patient with Mirena IUD in place since 9-13-17. Patient reports frequent yeast infection. She does currently yellow discharge with itching and odor.       Melva Dunlap is here for annual gynecologic exam.  HPI - Patient has IUD in place for contraception.  She has scant bleeding.  She reports vaginal discharge with odor and itching.  She reports recurrent \"yeast\" infections that she typically self-treats with OTC anti-fungal medications.  She requests STD testing.    Past Medical History:   Diagnosis Date   • ADHD    • Anxiety    • Asthma    • Chronic sinus infection    • Depression    • Eczema    • Gestational hypertension        Past Surgical History:   Procedure Laterality Date   • WISDOM TOOTH EXTRACTION         No Known Allergies    Social History     Socioeconomic History   • Marital status:      Spouse name: Not on file   • Number of children: Not on file   • Years of education: Not on file   • Highest education level: Not on file   Tobacco Use   • Smoking status: Never Smoker   • Smokeless tobacco: Never Used   Substance and Sexual Activity   • Alcohol use: Yes   • Drug use: No   • Sexual activity: Yes     Partners: Male     Birth control/protection: IUD       Family History   Problem Relation Age of Onset   • Heart attack Father    • Diabetes Father    • Bipolar disorder Father    • Schizophrenia Father    • Hypertension Mother    • Diabetes Paternal Grandmother    • Heart disease Maternal Grandmother    • Lung cancer Maternal Grandfather    • Alcohol abuse Maternal Uncle    • Drug abuse Maternal Uncle        No LMP recorded. Patient is not currently having periods (Reason: Other).    OB History  " "    Para Term  AB Living    1 1 1     1    SAB TAB Ectopic Molar Multiple Live Births            0 1          Vitals:    10/22/19 1432   BP: 132/78   Weight: 108 kg (238 lb)   Height: 175.3 cm (69\")       Physical Exam   Constitutional: She appears well-developed and well-nourished.   Genitourinary: Uterus normal. Pelvic exam was performed with patient supine. There is no tenderness or lesion on the right labia. There is no tenderness or lesion on the left labia. No foreign body in the vagina. Vaginal discharge found. Right adnexum does not display tenderness and does not display fullness. Left adnexum does not display tenderness and does not display fullness.   Cervix exhibits visible IUD strings. Cervix does not exhibit motion tenderness or lesion.   HENT:   Right Ear: External ear normal.   Left Ear: External ear normal.   Nose: Nose normal.   Eyes: Conjunctivae are normal.   Neck: Normal range of motion. Neck supple. No thyromegaly present.   Cardiovascular: Normal rate, regular rhythm and normal heart sounds.   Pulmonary/Chest: Effort normal. No stridor. She has no wheezes. Right breast exhibits no mass and no nipple discharge. Left breast exhibits no mass and no nipple discharge.   Abdominal: Soft. There is no tenderness. There is no guarding.   Musculoskeletal: Normal range of motion. She exhibits no edema.   Neurological: She is alert. Coordination normal.   Skin: Skin is warm and dry.   Psychiatric: She has a normal mood and affect. Her behavior is normal. Judgment and thought content normal.   Vitals reviewed.      Melva was seen today for gynecologic exam.    Diagnoses and all orders for this visit:    Screening examination for STD (sexually transmitted disease)  -     RPR    Encounter for annual routine gynecological examination  -     IgP, Aptima HPV  -     Discussed importance of regular screening and breast awareness.    HIV screening declined  -     HIV-1 / O / 2 Ag / Antibody 4th " Generation    Vaginal discharge  -     NuSwab VG+ - Swab, Vagina  -     If testing negative, consideration for hydrocortisone cream around areas of vulva that are irritated.        Shane Long MD

## 2019-10-23 LAB
HIV 1+2 AB+HIV1 P24 AG SERPL QL IA: NON REACTIVE
RPR SER QL: NON REACTIVE

## 2019-10-24 LAB
CYTOLOGIST CVX/VAG CYTO: NORMAL
CYTOLOGY CVX/VAG DOC CYTO: NORMAL
CYTOLOGY CVX/VAG DOC THIN PREP: NORMAL
DX ICD CODE: NORMAL
HIV 1 & 2 AB SER-IMP: NORMAL
HPV I/H RISK 4 DNA CVX QL PROBE+SIG AMP: NEGATIVE
OTHER STN SPEC: NORMAL
STAT OF ADQ CVX/VAG CYTO-IMP: NORMAL

## 2019-10-25 ENCOUNTER — TELEPHONE (OUTPATIENT)
Dept: OBSTETRICS AND GYNECOLOGY | Facility: CLINIC | Age: 29
End: 2019-10-25

## 2019-10-25 LAB
A VAGINAE DNA VAG QL NAA+PROBE: ABNORMAL SCORE
BVAB2 DNA VAG QL NAA+PROBE: ABNORMAL SCORE
C ALBICANS DNA VAG QL NAA+PROBE: NEGATIVE
C GLABRATA DNA VAG QL NAA+PROBE: NEGATIVE
C TRACH RRNA SPEC QL NAA+PROBE: NEGATIVE
MEGA1 DNA VAG QL NAA+PROBE: ABNORMAL SCORE
N GONORRHOEA RRNA SPEC QL NAA+PROBE: NEGATIVE
T VAGINALIS RRNA SPEC QL NAA+PROBE: POSITIVE

## 2019-10-25 RX ORDER — METRONIDAZOLE 500 MG/1
500 TABLET ORAL 2 TIMES DAILY
Qty: 14 TABLET | Refills: 0 | Status: SHIPPED | OUTPATIENT
Start: 2019-10-25 | End: 2019-11-01

## 2019-10-25 NOTE — TELEPHONE ENCOUNTER
Jaquelin    Please let her know that she has an STD called Trichomonas.  I called in antibiotics.  She should notify her partner so that he can be treated at his doctor or health department.  She should return for GISSEL in 4 weeks.    Ethan

## 2019-11-19 ENCOUNTER — OFFICE VISIT (OUTPATIENT)
Dept: OBSTETRICS AND GYNECOLOGY | Facility: CLINIC | Age: 29
End: 2019-11-19

## 2019-11-19 VITALS
BODY MASS INDEX: 33.92 KG/M2 | HEART RATE: 85 BPM | DIASTOLIC BLOOD PRESSURE: 78 MMHG | SYSTOLIC BLOOD PRESSURE: 131 MMHG | HEIGHT: 69 IN | WEIGHT: 229 LBS

## 2019-11-19 DIAGNOSIS — A59.9 TRICHOMONIASIS: Primary | ICD-10-CM

## 2019-11-19 PROCEDURE — 99213 OFFICE O/P EST LOW 20 MIN: CPT | Performed by: OBSTETRICS & GYNECOLOGY

## 2019-11-19 NOTE — PROGRESS NOTES
Subjective   Melva Dunlap is a 29 y.o. female.     Cc:  STD test    History of Present Illness - Patient is a 29 year old female here for STD test of cure.  She has had one sexual partner.  She was diagnosed with Trichomonas and completed antibiotics.  She states that her partner went to health department and his testing was negative.  She denies discharge.  No bleeding or spotting.  No fevers or chills.    The following portions of the patient's history were reviewed and updated as appropriate:   She  has a past medical history of ADHD, Anxiety, Asthma, Chronic sinus infection, Depression, Eczema, and Gestational hypertension.  She  reports that she has never smoked. She has never used smokeless tobacco. She reports that she drinks alcohol. She reports that she does not use drugs.  Current Outpatient Medications   Medication Sig Dispense Refill   • albuterol sulfate  (90 Base) MCG/ACT inhaler      • amLODIPine (NORVASC) 5 MG tablet Take 5 mg by mouth Daily.  1   • buPROPion XL (WELLBUTRIN XL) 300 MG 24 hr tablet Take 300 mg by mouth Daily.  5   • cetirizine (zyrTEC) 5 MG tablet Take 5 mg by mouth.     • cetirizine (zyrTEC) 5 MG tablet Take 5 mg by mouth Daily.     • escitalopram (LEXAPRO) 20 MG tablet Take 20 mg by mouth Every Morning.  1   • levonorgestrel (MIRENA) 20 MCG/24HR IUD 1 each by Intrauterine route 1 (One) Time.     • montelukast (SINGULAIR) 10 MG tablet Take 10 mg by mouth.     • VYVANSE 50 MG capsule Take 50 mg by mouth Every Morning  0     No current facility-administered medications for this visit.      She has No Known Allergies..    Review of Systems   Constitutional: Negative for chills and fever.   Genitourinary: Positive for vaginal discharge. Negative for dysuria, frequency and vaginal bleeding.       Objective   Physical Exam   Constitutional: She appears well-developed and well-nourished.   Genitourinary: Pelvic exam was performed with patient supine. There is no tenderness or  lesion on the right labia. There is no tenderness or lesion on the left labia. Cervix exhibits no motion tenderness, no discharge and no friability. No foreign body in the vagina. Vaginal discharge found.   Skin: Skin is warm and dry.   Psychiatric: She has a normal mood and affect. Her behavior is normal. Judgment and thought content normal.   Vitals reviewed.      Assessment/Plan   Melva was seen today for follow-up.    Diagnoses and all orders for this visit:    Trichomoniasis  -     NuSwab VG+ - Swab, Vagina  -     Await cultures.  Discussed how STDs are acquired.    Shane Long MD

## 2019-11-23 LAB
A VAGINAE DNA VAG QL NAA+PROBE: ABNORMAL SCORE
BVAB2 DNA VAG QL NAA+PROBE: ABNORMAL SCORE
C ALBICANS DNA VAG QL NAA+PROBE: NEGATIVE
C GLABRATA DNA VAG QL NAA+PROBE: NEGATIVE
C TRACH RRNA SPEC QL NAA+PROBE: POSITIVE
MEGA1 DNA VAG QL NAA+PROBE: ABNORMAL SCORE
N GONORRHOEA RRNA SPEC QL NAA+PROBE: NEGATIVE
T VAGINALIS RRNA SPEC QL NAA+PROBE: NEGATIVE

## 2019-11-25 ENCOUNTER — TELEPHONE (OUTPATIENT)
Dept: OBSTETRICS AND GYNECOLOGY | Facility: CLINIC | Age: 29
End: 2019-11-25

## 2019-11-25 RX ORDER — AZITHROMYCIN 500 MG/1
1000 TABLET, FILM COATED ORAL ONCE
Qty: 2 TABLET | Refills: 0 | Status: SHIPPED | OUTPATIENT
Start: 2019-11-25 | End: 2019-12-08 | Stop reason: SDUPTHER

## 2019-11-27 ENCOUNTER — TELEPHONE (OUTPATIENT)
Dept: OBSTETRICS AND GYNECOLOGY | Facility: CLINIC | Age: 29
End: 2019-11-27

## 2019-11-29 ENCOUNTER — HOSPITAL ENCOUNTER (EMERGENCY)
Facility: HOSPITAL | Age: 29
Discharge: HOME | End: 2019-11-29
Attending: EMERGENCY MEDICINE | Admitting: EMERGENCY MEDICINE

## 2019-11-29 ENCOUNTER — APPOINTMENT (OUTPATIENT)
Dept: CT IMAGING | Facility: HOSPITAL | Age: 29
End: 2019-11-29

## 2019-11-29 VITALS
OXYGEN SATURATION: 100 % | BODY MASS INDEX: 33.33 KG/M2 | WEIGHT: 225 LBS | HEART RATE: 80 BPM | RESPIRATION RATE: 18 BRPM | HEIGHT: 69 IN | TEMPERATURE: 97.7 F | SYSTOLIC BLOOD PRESSURE: 143 MMHG | DIASTOLIC BLOOD PRESSURE: 84 MMHG

## 2019-11-29 DIAGNOSIS — K52.9 GASTROENTERITIS: Primary | ICD-10-CM

## 2019-11-29 LAB
ALBUMIN SERPL-MCNC: 4.7 G/DL (ref 3.5–5.2)
ALBUMIN/GLOB SERPL: 1.6 G/DL
ALP SERPL-CCNC: 61 U/L (ref 39–117)
ALT SERPL W P-5'-P-CCNC: 31 U/L (ref 1–33)
ANION GAP SERPL CALCULATED.3IONS-SCNC: 12.7 MMOL/L (ref 5–15)
AST SERPL-CCNC: 19 U/L (ref 1–32)
BACTERIA UR QL AUTO: NORMAL /HPF
BASOPHILS # BLD AUTO: 0.06 10*3/MM3 (ref 0–0.2)
BASOPHILS NFR BLD AUTO: 0.4 % (ref 0–1.5)
BILIRUB SERPL-MCNC: 0.4 MG/DL (ref 0.2–1.2)
BILIRUB UR QL STRIP: NEGATIVE
BUN BLD-MCNC: 12 MG/DL (ref 6–20)
BUN/CREAT SERPL: 15.6 (ref 7–25)
CALCIUM SPEC-SCNC: 8.9 MG/DL (ref 8.6–10.5)
CHLORIDE SERPL-SCNC: 103 MMOL/L (ref 98–107)
CLARITY UR: CLEAR
CO2 SERPL-SCNC: 26.3 MMOL/L (ref 22–29)
COLOR UR: YELLOW
CREAT BLD-MCNC: 0.77 MG/DL (ref 0.57–1)
DEPRECATED RDW RBC AUTO: 38.2 FL (ref 37–54)
EOSINOPHIL # BLD AUTO: 0.12 10*3/MM3 (ref 0–0.4)
EOSINOPHIL NFR BLD AUTO: 0.7 % (ref 0.3–6.2)
ERYTHROCYTE [DISTWIDTH] IN BLOOD BY AUTOMATED COUNT: 12.4 % (ref 12.3–15.4)
GFR SERPL CREATININE-BSD FRML MDRD: 89 ML/MIN/1.73
GLOBULIN UR ELPH-MCNC: 2.9 GM/DL
GLUCOSE BLD-MCNC: 131 MG/DL (ref 65–99)
GLUCOSE UR STRIP-MCNC: NEGATIVE MG/DL
HCT VFR BLD AUTO: 41.8 % (ref 34–46.6)
HGB BLD-MCNC: 14.5 G/DL (ref 12–15.9)
HGB UR QL STRIP.AUTO: ABNORMAL
HOLD SPECIMEN: NORMAL
HOLD SPECIMEN: NORMAL
HYALINE CASTS UR QL AUTO: NORMAL /LPF
IMM GRANULOCYTES # BLD AUTO: 0.11 10*3/MM3 (ref 0–0.05)
IMM GRANULOCYTES NFR BLD AUTO: 0.7 % (ref 0–0.5)
KETONES UR QL STRIP: NEGATIVE
LEUKOCYTE ESTERASE UR QL STRIP.AUTO: NEGATIVE
LIPASE SERPL-CCNC: 37 U/L (ref 13–60)
LYMPHOCYTES # BLD AUTO: 0.5 10*3/MM3 (ref 0.7–3.1)
LYMPHOCYTES NFR BLD AUTO: 3 % (ref 19.6–45.3)
MCH RBC QN AUTO: 29.9 PG (ref 26.6–33)
MCHC RBC AUTO-ENTMCNC: 34.7 G/DL (ref 31.5–35.7)
MCV RBC AUTO: 86.2 FL (ref 79–97)
MONOCYTES # BLD AUTO: 0.96 10*3/MM3 (ref 0.1–0.9)
MONOCYTES NFR BLD AUTO: 5.7 % (ref 5–12)
NEUTROPHILS # BLD AUTO: 15.03 10*3/MM3 (ref 1.7–7)
NEUTROPHILS NFR BLD AUTO: 89.5 % (ref 42.7–76)
NITRITE UR QL STRIP: NEGATIVE
NRBC BLD AUTO-RTO: 0 /100 WBC (ref 0–0.2)
PH UR STRIP.AUTO: 5.5 [PH] (ref 5–8)
PLATELET # BLD AUTO: 318 10*3/MM3 (ref 140–450)
PMV BLD AUTO: 9.2 FL (ref 6–12)
POTASSIUM BLD-SCNC: 3.8 MMOL/L (ref 3.5–5.2)
PROT SERPL-MCNC: 7.6 G/DL (ref 6–8.5)
PROT UR QL STRIP: NEGATIVE
RBC # BLD AUTO: 4.85 10*6/MM3 (ref 3.77–5.28)
RBC # UR: NORMAL /HPF
REF LAB TEST METHOD: NORMAL
SODIUM BLD-SCNC: 142 MMOL/L (ref 136–145)
SP GR UR STRIP: 1.02 (ref 1–1.03)
SQUAMOUS #/AREA URNS HPF: NORMAL /HPF
UROBILINOGEN UR QL STRIP: ABNORMAL
WBC NRBC COR # BLD: 16.78 10*3/MM3 (ref 3.4–10.8)
WBC UR QL AUTO: NORMAL /HPF
WHOLE BLOOD HOLD SPECIMEN: NORMAL
WHOLE BLOOD HOLD SPECIMEN: NORMAL

## 2019-11-29 PROCEDURE — 96374 THER/PROPH/DIAG INJ IV PUSH: CPT

## 2019-11-29 PROCEDURE — 80053 COMPREHEN METABOLIC PANEL: CPT | Performed by: EMERGENCY MEDICINE

## 2019-11-29 PROCEDURE — 83690 ASSAY OF LIPASE: CPT | Performed by: EMERGENCY MEDICINE

## 2019-11-29 PROCEDURE — 74177 CT ABD & PELVIS W/CONTRAST: CPT

## 2019-11-29 PROCEDURE — 81001 URINALYSIS AUTO W/SCOPE: CPT | Performed by: EMERGENCY MEDICINE

## 2019-11-29 PROCEDURE — 25010000002 ONDANSETRON PER 1 MG: Performed by: EMERGENCY MEDICINE

## 2019-11-29 PROCEDURE — 85025 COMPLETE CBC W/AUTO DIFF WBC: CPT | Performed by: EMERGENCY MEDICINE

## 2019-11-29 PROCEDURE — 99283 EMERGENCY DEPT VISIT LOW MDM: CPT

## 2019-11-29 PROCEDURE — 25010000002 IOPAMIDOL 61 % SOLUTION: Performed by: EMERGENCY MEDICINE

## 2019-11-29 RX ORDER — ONDANSETRON HYDROCHLORIDE 8 MG/1
8 TABLET, FILM COATED ORAL EVERY 8 HOURS PRN
Qty: 10 TABLET | Refills: 0 | Status: SHIPPED | OUTPATIENT
Start: 2019-11-29 | End: 2020-03-13

## 2019-11-29 RX ORDER — SODIUM CHLORIDE 0.9 % (FLUSH) 0.9 %
10 SYRINGE (ML) INJECTION AS NEEDED
Status: DISCONTINUED | OUTPATIENT
Start: 2019-11-29 | End: 2019-11-29 | Stop reason: HOSPADM

## 2019-11-29 RX ORDER — ONDANSETRON 2 MG/ML
4 INJECTION INTRAMUSCULAR; INTRAVENOUS ONCE
Status: COMPLETED | OUTPATIENT
Start: 2019-11-29 | End: 2019-11-29

## 2019-11-29 RX ADMIN — SODIUM CHLORIDE 1000 ML: 9 INJECTION, SOLUTION INTRAVENOUS at 15:58

## 2019-11-29 RX ADMIN — ONDANSETRON 4 MG: 2 INJECTION INTRAMUSCULAR; INTRAVENOUS at 15:58

## 2019-11-29 RX ADMIN — IOPAMIDOL 85 ML: 612 INJECTION, SOLUTION INTRAVENOUS at 17:41

## 2019-12-09 RX ORDER — AZITHROMYCIN 500 MG/1
1000 TABLET, FILM COATED ORAL ONCE
Qty: 2 TABLET | Refills: 0 | Status: SHIPPED | OUTPATIENT
Start: 2019-12-09 | End: 2019-12-09

## 2019-12-10 ENCOUNTER — OFFICE VISIT (OUTPATIENT)
Dept: OBSTETRICS AND GYNECOLOGY | Facility: CLINIC | Age: 29
End: 2019-12-10

## 2019-12-10 VITALS
HEIGHT: 69 IN | BODY MASS INDEX: 32.58 KG/M2 | SYSTOLIC BLOOD PRESSURE: 140 MMHG | WEIGHT: 220 LBS | DIASTOLIC BLOOD PRESSURE: 80 MMHG

## 2019-12-10 DIAGNOSIS — Z20.2 CHLAMYDIA CONTACT, TREATED: ICD-10-CM

## 2019-12-10 DIAGNOSIS — R35.0 FREQUENT URINATION: Primary | ICD-10-CM

## 2019-12-10 LAB
BILIRUB BLD-MCNC: ABNORMAL MG/DL
GLUCOSE UR STRIP-MCNC: NEGATIVE MG/DL
KETONES UR QL: NEGATIVE
LEUKOCYTE EST, POC: ABNORMAL
NITRITE UR-MCNC: NEGATIVE MG/ML
PH UR: 5.5 [PH] (ref 5–8)
PROT UR STRIP-MCNC: ABNORMAL MG/DL
RBC # UR STRIP: ABNORMAL /UL
UROBILINOGEN UR QL: NORMAL

## 2019-12-10 PROCEDURE — 99213 OFFICE O/P EST LOW 20 MIN: CPT | Performed by: OBSTETRICS & GYNECOLOGY

## 2019-12-10 PROCEDURE — 81003 URINALYSIS AUTO W/O SCOPE: CPT | Performed by: OBSTETRICS & GYNECOLOGY

## 2019-12-10 RX ORDER — SULFAMETHOXAZOLE AND TRIMETHOPRIM 800; 160 MG/1; MG/1
1 TABLET ORAL 2 TIMES DAILY
Qty: 6 TABLET | Refills: 0 | Status: SHIPPED | OUTPATIENT
Start: 2019-12-10 | End: 2020-03-13

## 2019-12-11 NOTE — PROGRESS NOTES
Subjective   Melva Dunlap is a 29 y.o. female.     Cc:  Urinary frequency and STD exposure    History of Present Illness - Patient is a 29 year old female with recent diagnosis of Trichomonas and Chlamydia subsequent to that.  Patient states she has had only one sexual partner in her lifetime.  She states her partner also denies other sexual contact; however, she has had 2 STDs on separate occasions.  Patient took medication for both but still reports vaginal discharge.  She is unsure if partner was treated but has not had sexual contact with him.  In addition, patient reports urinary frequency and suprapubic discomfort.    The following portions of the patient's history were reviewed and updated as appropriate: She  has a past medical history of ADHD, Anxiety, Asthma, Chlamydia, Chronic sinus infection, Depression, Eczema, Gestational hypertension, and Urogenital trichomoniasis.  She does not have any pertinent problems on file.  She  reports that she has never smoked. She has never used smokeless tobacco. She reports that she drinks alcohol. She reports that she does not use drugs.  She has No Known Allergies..    Review of Systems   Constitutional: Negative for chills and fever.   Genitourinary: Positive for frequency, urgency and vaginal discharge.       Objective   Physical Exam   Constitutional: She appears well-developed and well-nourished.   Genitourinary: Pelvic exam was performed with patient supine. There is no tenderness or lesion on the right labia. There is no tenderness or lesion on the left labia. Cervix exhibits no motion tenderness, no discharge and no friability. No foreign body in the vagina. Vaginal discharge found.   Skin: Skin is warm and dry.   Psychiatric: She has a normal mood and affect. Her behavior is normal. Judgment and thought content normal.   Vitals reviewed.      Assessment/Plan   Melva was seen today for follow-up.    Diagnoses and all orders for this visit:    Frequent  urination  -     Urine Culture - Urine, Urine, Clean Catch  -     POC Urinalysis Dipstick, Automated  -     sulfamethoxazole-trimethoprim (BACTRIM DS) 800-160 MG per tablet; Take 1 tablet by mouth 2 (Two) Times a Day.    Chlamydia contact, treated  -     [facility] - Swab, Vagina  -     Had long discussion with patient about STD exposures and modes of contact.  Will await results.    Shane Long MD

## 2019-12-12 LAB
BACTERIA UR CULT: NORMAL
BACTERIA UR CULT: NORMAL

## 2019-12-14 LAB
A VAGINAE DNA VAG QL NAA+PROBE: NORMAL SCORE
BVAB2 DNA VAG QL NAA+PROBE: NORMAL SCORE
C ALBICANS DNA VAG QL NAA+PROBE: NEGATIVE
C GLABRATA DNA VAG QL NAA+PROBE: NEGATIVE
C TRACH RRNA SPEC QL NAA+PROBE: NEGATIVE
MEGA1 DNA VAG QL NAA+PROBE: NORMAL SCORE
N GONORRHOEA RRNA SPEC QL NAA+PROBE: NEGATIVE
T VAGINALIS RRNA SPEC QL NAA+PROBE: NEGATIVE

## 2019-12-16 ENCOUNTER — TELEPHONE (OUTPATIENT)
Dept: OBSTETRICS AND GYNECOLOGY | Facility: CLINIC | Age: 29
End: 2019-12-16

## 2019-12-16 NOTE — TELEPHONE ENCOUNTER
Left message for patient to call back. 12-20-19/lw  Left message for patient to call back. 12-19-19/lw   Left message for patient to call back. 12-16-19/lw   ----- Message from Shane Long MD sent at 12/16/2019  2:02 PM EST -----  LAW - Let her know that her cultures were negative.

## 2019-12-27 ENCOUNTER — TELEPHONE (OUTPATIENT)
Dept: OBSTETRICS AND GYNECOLOGY | Facility: CLINIC | Age: 29
End: 2019-12-27

## 2019-12-27 NOTE — TELEPHONE ENCOUNTER
----- Message from Shane Long MD sent at 12/16/2019  2:02 PM EST -----  LAW - Let her know that her cultures were negative.

## 2020-03-12 ENCOUNTER — TELEPHONE (OUTPATIENT)
Dept: OBSTETRICS AND GYNECOLOGY | Facility: CLINIC | Age: 30
End: 2020-03-12

## 2020-03-13 ENCOUNTER — OFFICE VISIT (OUTPATIENT)
Dept: OBSTETRICS AND GYNECOLOGY | Facility: CLINIC | Age: 30
End: 2020-03-13

## 2020-03-13 VITALS
DIASTOLIC BLOOD PRESSURE: 80 MMHG | BODY MASS INDEX: 34.8 KG/M2 | HEIGHT: 69 IN | SYSTOLIC BLOOD PRESSURE: 138 MMHG | WEIGHT: 235 LBS

## 2020-03-13 DIAGNOSIS — N93.8 DYSFUNCTIONAL UTERINE BLEEDING: Primary | ICD-10-CM

## 2020-03-13 DIAGNOSIS — N89.8 VAGINAL DISCHARGE: ICD-10-CM

## 2020-03-13 PROCEDURE — 99213 OFFICE O/P EST LOW 20 MIN: CPT | Performed by: OBSTETRICS & GYNECOLOGY

## 2020-03-13 RX ORDER — ONDANSETRON 4 MG/1
4 TABLET, FILM COATED ORAL DAILY PRN
Qty: 20 TABLET | Refills: 0 | Status: SHIPPED | OUTPATIENT
Start: 2020-03-13 | End: 2021-03-13

## 2020-03-13 NOTE — PROGRESS NOTES
Subjective   Melva Dunlap is a 30 y.o. female.     Cc:  Irregular bleeding and discharge    History of Present Illness - Patient is a 30 year old female with irregular bleeding.  In addition, she has had a vaginal discharge.  IUD in place.  Last fall, patient was diagnosed with 2 different STDs.  After treatment, patient underwent GISSEL that was negative.  She has not been sexual active.      The following portions of the patient's history were reviewed and updated as appropriate:   She  has a past medical history of ADHD, Anxiety, Asthma, Chlamydia, Chronic sinus infection, Depression, Eczema, Gestational hypertension, and Urogenital trichomoniasis.  She  reports that she has never smoked. She has never used smokeless tobacco. She reports that she drinks alcohol. She reports that she does not use drugs.  Current Outpatient Medications   Medication Sig Dispense Refill   • albuterol sulfate  (90 Base) MCG/ACT inhaler      • amLODIPine (NORVASC) 5 MG tablet Take 5 mg by mouth Daily.  1   • buPROPion XL (WELLBUTRIN XL) 300 MG 24 hr tablet Take 300 mg by mouth Daily.  5   • cetirizine (zyrTEC) 5 MG tablet Take 5 mg by mouth.     • cetirizine (zyrTEC) 5 MG tablet Take 5 mg by mouth Daily.     • escitalopram (LEXAPRO) 20 MG tablet Take 20 mg by mouth Every Morning.  1   • levonorgestrel (MIRENA) 20 MCG/24HR IUD 1 each by Intrauterine route 1 (One) Time.     • montelukast (SINGULAIR) 10 MG tablet Take 10 mg by mouth.     • ondansetron (ZOFRAN) 4 MG tablet Take 1 tablet by mouth Daily As Needed for Nausea or Vomiting. 20 tablet 0   • VYVANSE 50 MG capsule Take 50 mg by mouth Every Morning  0     No current facility-administered medications for this visit.      She has No Known Allergies..    Review of Systems   Constitutional: Negative for chills and fever.   Genitourinary: Positive for vaginal bleeding and vaginal discharge.       Objective   Physical Exam   Constitutional: She appears well-developed and  well-nourished.   Genitourinary: Pelvic exam was performed with patient supine. There is no tenderness or lesion on the right labia. There is no tenderness or lesion on the left labia. Cervix exhibits no motion tenderness, no discharge and no friability. No foreign body in the vagina. Vaginal discharge found.   Genitourinary Comments: IUD strings noted   Psychiatric: She has a normal mood and affect. Her behavior is normal. Judgment and thought content normal.   Vitals reviewed.      Assessment/Plan   Melva was seen today for vaginal bleeding.    Diagnoses and all orders for this visit:    Dysfunctional uterine bleeding        -     Continue to observe.  Episode of bleeding was isolated.  Vaginal discharge  -     NuSwab VG+ - Swab, Vagina  -     Await cultures.  Other orders  -     ondansetron (ZOFRAN) 4 MG tablet; Take 1 tablet by mouth Daily As Needed for Nausea or Vomiting.     Shane Long MD

## 2020-03-17 LAB
A VAGINAE DNA VAG QL NAA+PROBE: NORMAL SCORE
BVAB2 DNA VAG QL NAA+PROBE: NORMAL SCORE
C ALBICANS DNA VAG QL NAA+PROBE: NEGATIVE
C GLABRATA DNA VAG QL NAA+PROBE: NEGATIVE
C TRACH DNA VAG QL NAA+PROBE: NEGATIVE
MEGA1 DNA VAG QL NAA+PROBE: NORMAL SCORE
N GONORRHOEA DNA VAG QL NAA+PROBE: NEGATIVE
T VAGINALIS DNA VAG QL NAA+PROBE: NEGATIVE

## 2020-03-18 ENCOUNTER — TELEPHONE (OUTPATIENT)
Dept: OBSTETRICS AND GYNECOLOGY | Facility: CLINIC | Age: 30
End: 2020-03-18

## 2020-03-18 NOTE — TELEPHONE ENCOUNTER
Patient aware of results. 3-/lw  ----- Message from Shane Long MD sent at 3/17/2020  5:39 PM EDT -----  LAW - Let her know that her cultures are negative

## 2020-09-09 ENCOUNTER — OFFICE VISIT (OUTPATIENT)
Dept: OBSTETRICS AND GYNECOLOGY | Facility: CLINIC | Age: 30
End: 2020-09-09

## 2020-09-09 VITALS
WEIGHT: 245 LBS | BODY MASS INDEX: 36.29 KG/M2 | DIASTOLIC BLOOD PRESSURE: 80 MMHG | HEIGHT: 69 IN | SYSTOLIC BLOOD PRESSURE: 134 MMHG

## 2020-09-09 DIAGNOSIS — Z11.3 SCREENING EXAMINATION FOR STD (SEXUALLY TRANSMITTED DISEASE): Primary | ICD-10-CM

## 2020-09-09 PROCEDURE — 99212 OFFICE O/P EST SF 10 MIN: CPT | Performed by: OBSTETRICS & GYNECOLOGY

## 2020-09-09 RX ORDER — BUPROPION HYDROCHLORIDE 150 MG/1
150 TABLET ORAL EVERY MORNING
COMMUNITY
Start: 2020-08-18

## 2020-09-09 RX ORDER — CETIRIZINE HYDROCHLORIDE 10 MG/1
10 TABLET ORAL DAILY
COMMUNITY
Start: 2020-08-20

## 2020-09-09 RX ORDER — ALUMINUM CHLORIDE 20 %
SOLUTION, NON-ORAL TOPICAL
COMMUNITY
Start: 2020-08-26

## 2020-09-10 LAB
HIV 1+2 AB+HIV1 P24 AG SERPL QL IA: NON REACTIVE
RPR SER QL: NORMAL

## 2020-09-10 NOTE — PROGRESS NOTES
Subjective   Melva Dunlap is a 30 y.o. female.     Cc:  STD exposure    History of Present Illness - Patient is a 30 year old  who presents for STD screen.  Patient had unprotected intercourse and requests testing.  She has no symptoms of discharge, fever, pelvic pain or bleeding.    The following portions of the patient's history were reviewed and updated as appropriate:   She  has a past medical history of ADHD, Anxiety, Asthma, Chlamydia, Chronic sinus infection, Depression, Eczema, Gestational hypertension, and Urogenital trichomoniasis.  She  reports that she has never smoked. She has never used smokeless tobacco. She reports that she drinks alcohol. She reports that she does not use drugs.  Current Outpatient Medications   Medication Sig Dispense Refill   • albuterol sulfate  (90 Base) MCG/ACT inhaler      • amLODIPine (NORVASC) 5 MG tablet Take 5 mg by mouth Daily.  1   • buPROPion XL (WELLBUTRIN XL) 150 MG 24 hr tablet Take 150 mg by mouth Every Morning.     • cetirizine (zyrTEC) 10 MG tablet Take 10 mg by mouth Daily.     • cetirizine (zyrTEC) 5 MG tablet Take 5 mg by mouth.     • cetirizine (zyrTEC) 5 MG tablet Take 5 mg by mouth Daily.     • DRYSOL 20 % external solution APPLY SPARINGLY TO AFFECTED AREA EVERY DAY     • escitalopram (LEXAPRO) 20 MG tablet Take 20 mg by mouth Every Morning.  1   • levonorgestrel (MIRENA) 20 MCG/24HR IUD 1 each by Intrauterine route 1 (One) Time.     • montelukast (SINGULAIR) 10 MG tablet Take 10 mg by mouth.     • ondansetron (ZOFRAN) 4 MG tablet Take 1 tablet by mouth Daily As Needed for Nausea or Vomiting. 20 tablet 0   • VYVANSE 50 MG capsule Take 50 mg by mouth Every Morning  0     No current facility-administered medications for this visit.      She has No Known Allergies..    Review of Systems   Constitutional: Negative for chills and fever.   Genitourinary: Negative for pelvic pain, vaginal bleeding and vaginal discharge.       Objective   Physical Exam    Constitutional: She appears well-developed and well-nourished.   Genitourinary: Pelvic exam was performed with patient supine. There is no tenderness or lesion on the right labia. There is no tenderness or lesion on the left labia. Cervix exhibits no motion tenderness, no discharge and no friability. Vaginal discharge found.   Neurological: She is alert.   Skin: Skin is warm and dry.   Psychiatric: She has a normal mood and affect. Her behavior is normal. Judgment and thought content normal.   Vitals reviewed.      Assessment/Plan   Melva was seen today for exposure to std.    Diagnoses and all orders for this visit:    Screening examination for STD (sexually transmitted disease)  -     Chlamydia trachomatis, Neisseria gonorrhoeae, Trichomonas vaginalis, PCR - Swab, Vagina  -     HIV-1 / O / 2 Ag / Antibody 4th Generation  -     RPR  -     Await testing results.    Shane Long MD

## 2020-09-11 ENCOUNTER — TELEPHONE (OUTPATIENT)
Dept: OBSTETRICS AND GYNECOLOGY | Facility: CLINIC | Age: 30
End: 2020-09-11

## 2020-09-11 LAB
C TRACH RRNA SPEC QL NAA+PROBE: NEGATIVE
N GONORRHOEA RRNA SPEC QL NAA+PROBE: NEGATIVE
T VAGINALIS DNA SPEC QL NAA+PROBE: NEGATIVE

## 2021-04-16 ENCOUNTER — BULK ORDERING (OUTPATIENT)
Dept: CASE MANAGEMENT | Facility: OTHER | Age: 31
End: 2021-04-16

## 2021-04-16 DIAGNOSIS — Z23 IMMUNIZATION DUE: ICD-10-CM

## 2022-03-29 ENCOUNTER — CONSULT (OUTPATIENT)
Dept: BARIATRICS/WEIGHT MGMT | Facility: CLINIC | Age: 32
End: 2022-03-29

## 2022-03-29 VITALS
BODY MASS INDEX: 42.53 KG/M2 | HEART RATE: 116 BPM | DIASTOLIC BLOOD PRESSURE: 96 MMHG | SYSTOLIC BLOOD PRESSURE: 135 MMHG | WEIGHT: 271 LBS | RESPIRATION RATE: 18 BRPM | HEIGHT: 67 IN | TEMPERATURE: 97.5 F

## 2022-03-29 DIAGNOSIS — E66.01 OBESITY, CLASS III, BMI 40-49.9 (MORBID OBESITY): Primary | ICD-10-CM

## 2022-03-29 DIAGNOSIS — Z01.818 PRE-OP EVALUATION: ICD-10-CM

## 2022-03-29 DIAGNOSIS — K21.9 GASTROESOPHAGEAL REFLUX DISEASE, UNSPECIFIED WHETHER ESOPHAGITIS PRESENT: ICD-10-CM

## 2022-03-29 DIAGNOSIS — R06.83 SNORING: ICD-10-CM

## 2022-03-29 DIAGNOSIS — I10 BENIGN ESSENTIAL HYPERTENSION: ICD-10-CM

## 2022-03-29 PROBLEM — M25.50 MULTIPLE JOINT PAIN: Status: ACTIVE | Noted: 2022-03-29

## 2022-03-29 PROBLEM — Z01.419 ENCOUNTER FOR GYNECOLOGICAL EXAMINATION: Status: RESOLVED | Noted: 2018-06-14 | Resolved: 2022-03-29

## 2022-03-29 PROBLEM — Z71.3 DIETARY COUNSELING: Status: ACTIVE | Noted: 2022-03-29

## 2022-03-29 PROBLEM — E66.813 OBESITY, CLASS III, BMI 40-49.9 (MORBID OBESITY): Status: ACTIVE | Noted: 2022-03-29

## 2022-03-29 PROBLEM — F90.9 ADHD: Status: ACTIVE | Noted: 2022-03-29

## 2022-03-29 PROBLEM — R60.9 EDEMA: Status: ACTIVE | Noted: 2022-03-29

## 2022-03-29 PROBLEM — J45.909 ASTHMA: Status: ACTIVE | Noted: 2022-03-29

## 2022-03-29 PROBLEM — F41.8 DEPRESSION WITH ANXIETY: Status: ACTIVE | Noted: 2022-03-29

## 2022-03-29 PROBLEM — Z30.431 IUD CHECK UP: Status: RESOLVED | Noted: 2017-10-12 | Resolved: 2022-03-29

## 2022-03-29 PROBLEM — T83.32XA IUD THREADS LOST: Status: RESOLVED | Noted: 2018-06-14 | Resolved: 2022-03-29

## 2022-03-29 PROCEDURE — 99205 OFFICE O/P NEW HI 60 MIN: CPT | Performed by: NURSE PRACTITIONER

## 2022-03-29 RX ORDER — SUMATRIPTAN 50 MG/1
TABLET, FILM COATED ORAL
COMMUNITY
Start: 2022-02-25

## 2022-03-29 RX ORDER — CALCIUM CARBONATE 200(500)MG
1 TABLET,CHEWABLE ORAL AS NEEDED
COMMUNITY
End: 2022-05-10

## 2022-03-29 RX ORDER — TRAZODONE HYDROCHLORIDE 50 MG/1
TABLET ORAL
COMMUNITY
Start: 2022-03-23

## 2022-03-29 NOTE — PROGRESS NOTES
"Bariatric Nutrition Counseling Interview    Patient Name: Melva MANCERA    YOB: 1990   Age: 32 y.o.  Sex: female  MRN: [id number]     Procedure considering: sleeve    Height: 67.09\"            Current weight: 271 lbs   BMI: 42.33 kg/m²                          Highest weight: 275 lbs                              Lowest weight: 140 lbs     Patient goals: 180-190 lbs  Weight history: weight gain as a result of lack nutrition knowledge when growing up  Additional health issues to consider: hypertension, GERD, depression, anxiety, edema    Patient has tried to lose weight in the past including Slim Fast, calorie counting, portion control, OTC weight loss aids, prescription medications and exercise. Patient has lost up to 50 lbs on diets, but was unable to maintain this long term.     Diet history revealed no diet restrictions or food allergies. May be mildly lactose intolerant. Diet recall: B: cereal with milk; L: quesadilla or nachos; D: salad with cheese and ranch dressing, hamburger or spaghetti; S: peanut butter. Drinking water and soda. Eating out 4-5 times per week. 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    Patient identifies problem areas to be physical hunger, eating large portions, high calorie food choices and eating in response to stress     Exercise: active job lifting/moving boxes      Pre and post op nutrition education completed and program materials provided. Emphasized the importance of taking in at least 70 g protein and 64 oz fluid daily. Discussed personal habits and lifestyle behaviors that may influence weight loss efforts. Patient verbalized understanding and questions were answered.  Short term goals: decrease/eliminate high calorie and carbonated beverages  Additional nutrition counseling available and encouraged both pre and post op.  Patient demonstrated a good comprehension of diet requirements and commitment to make healthy changes. Melva MANCERA appears " to be a suitable candidate for bariatric surgery from a nutritional standpoint.      Margie Phillip RD  03/29/22  15:36 EDT

## 2022-03-29 NOTE — PROGRESS NOTES
[facility] BARIATRIC SURGERY  [facility] [facility] [address] [id number] [phone]  [address] [id number] [phone]  Dept: [phone]  3/29/2022      Melva MANCERA.  [id number]  [id number]  1990  female      Chief Complaint of weight gain; unable to maintain weight loss    History of Present Illness:   Melva is a 32 y.o. female who presents today for evaluation, education and consultation regarding weight loss surgery. The patient is interested in the sleeve gastrectomy.      Diet History:Melva has been overweight for at least 14 years, has been 35 pounds or more overweight for at least 14 years, has been 100 pounds or more overweight for 9 or more years.  The most weight Melva lost was 50 pounds on reduced calorie and maintained the weight loss for 6 months. Melva describes her eating habits as volume eater. Melva MANCERA has tried reduced calorie, exercising, medications and slim fast among others with success of losing up to 50 pounds, but in each instance regained the weight.    See dietician documentation for complete history.    Bariatric Surgery Evaluation: The patient is being seen for an initial visit for bariatric surgery evaluation.     Bariatric Co-morbidities:  sleep disturbances with possible sleep apnea, hypertension, GERD, asthma, edema and depression    Patient Active Problem List   Diagnosis   • Vitamin D deficiency   • Benign essential hypertension   • Obesity, Class III, BMI 40-49.9 (morbid obesity) (HCC)   • Dietary counseling   • Pre-op evaluation   • Depression with anxiety   • ADHD   • Asthma   • Edema   • Snoring   • Multiple joint pain   • GERD (gastroesophageal reflux disease)       Past Medical History:   Diagnosis Date   • ADHD    • Anxiety    • Asthma    • Bronchitis    • Chlamydia    • Chronic sinus infection    • Depression    • Eczema    • Gestational hypertension    • Urogenital  trichomoniasis        Past Surgical History:   Procedure Laterality Date   • WISDOM TOOTH EXTRACTION         No Known Allergies      Current Outpatient Medications:   •  albuterol sulfate  (90 Base) MCG/ACT inhaler, , Disp: , Rfl:   •  amLODIPine (NORVASC) 5 MG tablet, Take 5 mg by mouth Daily., Disp: , Rfl: 1  •  buPROPion XL (WELLBUTRIN XL) 150 MG 24 hr tablet, Take 150 mg by mouth Every Morning., Disp: , Rfl:   •  calcium carbonate (TUMS) 500 MG chewable tablet, Chew 1 tablet As Needed., Disp: , Rfl:   •  cetirizine (zyrTEC) 10 MG tablet, Take 10 mg by mouth Daily., Disp: , Rfl:   •  DRYSOL 20 % external solution, APPLY SPARINGLY TO AFFECTED AREA EVERY DAY, Disp: , Rfl:   •  escitalopram (LEXAPRO) 20 MG tablet, Take 20 mg by mouth Every Morning., Disp: , Rfl: 1  •  levonorgestrel (MIRENA) 20 MCG/24HR IUD, 1 each by Intrauterine route 1 (One) Time., Disp: , Rfl:   •  montelukast (SINGULAIR) 10 MG tablet, Take 10 mg by mouth., Disp: , Rfl:   •  SUMAtriptan (IMITREX) 50 MG tablet, PLEASE SEE ATTACHED FOR DETAILED DIRECTIONS, Disp: , Rfl:   •  traZODone (DESYREL) 50 MG tablet, TAKE 1 OR 2 TABLETS BY MOUTH AT BEDTIME AS NEEDED FOR SLEEP, Disp: , Rfl:   •  VYVANSE 50 MG capsule, Take 50 mg by mouth Every Morning, Disp: , Rfl: 0    Social History     Socioeconomic History   • Marital status:    • Number of children: 1   Tobacco Use   • Smoking status: Never Smoker   • Smokeless tobacco: Never Used   Substance and Sexual Activity   • Alcohol use: Yes     Comment: social   • Drug use: Yes     Types: Marijuana     Comment: occ   • Sexual activity: Yes     Partners: Male     Birth control/protection: I.U.D.       Family History   Problem Relation Age of Onset   • Heart attack Father    • Diabetes Father    • Bipolar disorder Father    • Schizophrenia Father    • Hypertension Mother    • Diabetes Paternal Grandmother    • Heart disease Maternal Grandmother    • Lung cancer Maternal Grandfather    • Alcohol  abuse Maternal Uncle    • Drug abuse Maternal Uncle          Review of Systems:  Review of Systems   All other systems reviewed and are negative.      Physical Exam:  Vital Signs:  Weight: 123 kg (271 lb)   Body mass index is 42.33 kg/m².  Temp: 97.5 °F (36.4 °C)   Heart Rate: 116   BP: 135/96     Physical Exam  Vitals reviewed.   Constitutional:       Appearance: Normal appearance. She is well-developed. She is obese.   HENT:      Head: Normocephalic and atraumatic.   Cardiovascular:      Rate and Rhythm: Normal rate.   Pulmonary:      Effort: Pulmonary effort is normal.      Breath sounds: Normal breath sounds.   Abdominal:      General: Bowel sounds are normal. There is no distension.      Palpations: Abdomen is soft.      Tenderness: There is no abdominal tenderness.   Musculoskeletal:         General: Normal range of motion.   Skin:     General: Skin is warm and dry.   Neurological:      Mental Status: She is alert and oriented to person, place, and time.   Psychiatric:         Behavior: Behavior normal.         Thought Content: Thought content normal.         Judgment: Judgment normal.            Assessment:         Melva MANCERA is a 32 y.o. year old female with medically complicated severe obesity. Weight: 123 kg (271 lb), Body mass index is 42.33 kg/m². and weight related problems including sleep disturbances with possible sleep apnea, hypertension, GERD, asthma, edema and depression.    I explained in detail, potential surgical options of interest to the patient including the RNY gastric bypass, sleeve gastrectomy, and gastric band while considering the patient's medical history. At this time, the patient expressed interest in the sleeve gastrectomy.  All of those procedures can be performed laparoscopically but there is a chance to convert to open if any technical challenges or complications do occur.  Bariatric surgery is not cosmetic surgery but rather a tool to help a patient make a life-long  commitment lifestyle changes including diet, exercise, behavior changes, and taking supplemental vitamins and minerals. The risks, benefits, alternatives, and potential complications of all of the procedures were explained in detail including but not limited to failure to lose weight or gain weight and change in body image, metabolic complications. The patient was informed that surgery is a tool and requires lifestyle change including dietary modification to be successful. The patient was made aware of the need for vitamin supplementation after surgery due to the risk of deficiencies including but not limited to calcium, thiamine, vitamin B12, folate, iron, and anemia.    The patient was advised to start a high protein, low fat and low carbohydrate diet. The patient was given individualized information by our dietician along with handouts. The patient was encouraged to start routine exercise including but not limited to 150 minutes per week. The patient received a resistance band along with a handout of exercises.     The patient was given information regarding the GLENN educational video. GLENN is an internet based educational video which explains the surgical procedure and answers basic questions regarding the procedure. The patient was provided with instructions and a password to watch the video.    Due to the patient's BMI, history and co-morbidities they are at a high risk for surgery and will obtain the following:  -The patient has been advised that a letter of medical support and a history and physical must be obtained from her primary care physician. The patient was given a copy of a sample form, that will suffice as their letter to take to their primary are provider.  -A referral for pre-operative psychological evaluation was ordered for the patient to evaluate candidacy as well as provide mental health support, should it be warranted before or after surgery.  -Pre-operative testing will be ordered to  include: CBC, CMP,TSH and HgbA1C will be drawn, CXR, EKG.    -Melva MANCERA will be set up for a pre-operative diagnostic esophagogastroduodenoscopy with biopsy for evaluation. The risks and benefits of the procedure were discussed with the patient in detail and all questions were answered.  Possibility of perforation, bleeding, aspiration, anoxic brain injury, respiratory and/or cardiac arrest and death were discussed. The patient received handouts regarding her instructions and all questions were answered.      Melva MANCERA was screened for sleep apnea in our office today and based on their results she is moderate risk for WALT; home sleep study ordered. The risks, as they relate to chronic hypercapnia r/t untreated WALT were discussed with the patient and they verbalized understanding.     The patient understands the surgical procedures and the different surgical options that are available.  She understands the lifestyle changes that would be required after surgery and has agreed to participate in a pre-operative and postoperative weight management program.  She also expressed understanding of possible risks, had several questions answered and desires to proceed.    I think she is a good candidate for this surgery, and is interested in a sleeve gastrectomy.    Encounter Diagnoses   Name Primary?   • Obesity, Class III, BMI 40-49.9 (morbid obesity) (HCC) Yes   • Benign essential hypertension    • Gastroesophageal reflux disease, unspecified whether esophagitis present    • Snoring        Plan:    The consultation plan was reviewed with the patient.  Patient will have evaluations and follow up with bariatric dieticians and a psychologist before undergoing a multidisciplinary review of her candidacy.  We also discussed the weight loss requirement and rationale, and other program requirements.    Total encounter exceeded 60 minutes including reviewing their chart/outside medical records/previous visits, face to  face time obtaining medical history and physical, reviewing surgical options and answering any questions, discussing pre-operative plan and requirements along with care coordination.         Jaquelin Linares, APRN  3/29/2022

## 2022-03-30 ENCOUNTER — HOSPITAL ENCOUNTER (OUTPATIENT)
Dept: CARDIOLOGY | Facility: HOSPITAL | Age: 32
Discharge: HOME | End: 2022-03-30

## 2022-03-30 ENCOUNTER — OFFICE VISIT (OUTPATIENT)
Dept: BARIATRICS/WEIGHT MGMT | Facility: CLINIC | Age: 32
End: 2022-03-30

## 2022-03-30 ENCOUNTER — TRANSCRIBE ORDERS (OUTPATIENT)
Dept: ADMINISTRATIVE | Facility: HOSPITAL | Age: 32
End: 2022-03-30

## 2022-03-30 ENCOUNTER — HOSPITAL ENCOUNTER (OUTPATIENT)
Dept: GENERAL RADIOLOGY | Facility: HOSPITAL | Age: 32
Discharge: HOME | End: 2022-03-30

## 2022-03-30 ENCOUNTER — LAB (OUTPATIENT)
Dept: LAB | Facility: HOSPITAL | Age: 32
End: 2022-03-30

## 2022-03-30 DIAGNOSIS — K21.9 GASTROESOPHAGEAL REFLUX DISEASE, UNSPECIFIED WHETHER ESOPHAGITIS PRESENT: ICD-10-CM

## 2022-03-30 DIAGNOSIS — Z01.818 PRE-OP EXAM: ICD-10-CM

## 2022-03-30 DIAGNOSIS — I10 BENIGN ESSENTIAL HYPERTENSION: ICD-10-CM

## 2022-03-30 DIAGNOSIS — Z71.89 PRE-BARIATRIC SURGERY PSYCHOLOGICAL EVALUATION: Primary | ICD-10-CM

## 2022-03-30 DIAGNOSIS — R06.83 SNORING: ICD-10-CM

## 2022-03-30 DIAGNOSIS — Z01.818 PRE-OP EXAM: Primary | ICD-10-CM

## 2022-03-30 DIAGNOSIS — Z01.818 PRE-OP EVALUATION: ICD-10-CM

## 2022-03-30 DIAGNOSIS — E66.01 OBESITY, CLASS III, BMI 40-49.9 (MORBID OBESITY): ICD-10-CM

## 2022-03-30 LAB
ALBUMIN SERPL-MCNC: 4.3 G/DL (ref 3.5–5.2)
ALBUMIN/GLOB SERPL: 1.5 G/DL
ALP SERPL-CCNC: 67 U/L (ref 39–117)
ALT SERPL W P-5'-P-CCNC: 35 U/L (ref 1–33)
ANION GAP SERPL CALCULATED.3IONS-SCNC: 10 MMOL/L (ref 5–15)
AST SERPL-CCNC: 25 U/L (ref 1–32)
BASOPHILS # BLD AUTO: 0.07 10*3/MM3 (ref 0–0.2)
BASOPHILS NFR BLD AUTO: 0.9 % (ref 0–1.5)
BILIRUB SERPL-MCNC: 0.2 MG/DL (ref 0–1.2)
BUN SERPL-MCNC: 12 MG/DL (ref 6–20)
BUN/CREAT SERPL: 14.8 (ref 7–25)
CALCIUM SPEC-SCNC: 9 MG/DL (ref 8.6–10.5)
CHLORIDE SERPL-SCNC: 104 MMOL/L (ref 98–107)
CO2 SERPL-SCNC: 25 MMOL/L (ref 22–29)
CREAT SERPL-MCNC: 0.81 MG/DL (ref 0.57–1)
DEPRECATED RDW RBC AUTO: 42.2 FL (ref 37–54)
EGFRCR SERPLBLD CKD-EPI 2021: 99.1 ML/MIN/1.73
EOSINOPHIL # BLD AUTO: 0.21 10*3/MM3 (ref 0–0.4)
EOSINOPHIL NFR BLD AUTO: 2.7 % (ref 0.3–6.2)
ERYTHROCYTE [DISTWIDTH] IN BLOOD BY AUTOMATED COUNT: 13 % (ref 12.3–15.4)
GLOBULIN UR ELPH-MCNC: 2.9 GM/DL
GLUCOSE SERPL-MCNC: 95 MG/DL (ref 65–99)
HBA1C MFR BLD: 5.6 % (ref 4.8–5.6)
HCT VFR BLD AUTO: 43.8 % (ref 34–46.6)
HGB BLD-MCNC: 14.1 G/DL (ref 12–15.9)
IMM GRANULOCYTES # BLD AUTO: 0.02 10*3/MM3 (ref 0–0.05)
IMM GRANULOCYTES NFR BLD AUTO: 0.3 % (ref 0–0.5)
LYMPHOCYTES # BLD AUTO: 2.05 10*3/MM3 (ref 0.7–3.1)
LYMPHOCYTES NFR BLD AUTO: 26.1 % (ref 19.6–45.3)
MCH RBC QN AUTO: 28.3 PG (ref 26.6–33)
MCHC RBC AUTO-ENTMCNC: 32.2 G/DL (ref 31.5–35.7)
MCV RBC AUTO: 88 FL (ref 79–97)
MONOCYTES # BLD AUTO: 0.63 10*3/MM3 (ref 0.1–0.9)
MONOCYTES NFR BLD AUTO: 8 % (ref 5–12)
NEUTROPHILS NFR BLD AUTO: 4.86 10*3/MM3 (ref 1.7–7)
NEUTROPHILS NFR BLD AUTO: 62 % (ref 42.7–76)
NRBC BLD AUTO-RTO: 0 /100 WBC (ref 0–0.2)
PLATELET # BLD AUTO: 370 10*3/MM3 (ref 140–450)
PMV BLD AUTO: 8.8 FL (ref 6–12)
POTASSIUM SERPL-SCNC: 4 MMOL/L (ref 3.5–5.2)
PROT SERPL-MCNC: 7.2 G/DL (ref 6–8.5)
RBC # BLD AUTO: 4.98 10*6/MM3 (ref 3.77–5.28)
SODIUM SERPL-SCNC: 139 MMOL/L (ref 136–145)
TSH SERPL DL<=0.05 MIU/L-ACNC: 1.4 UIU/ML (ref 0.27–4.2)
WBC NRBC COR # BLD: 7.84 10*3/MM3 (ref 3.4–10.8)

## 2022-03-30 PROCEDURE — 83036 HEMOGLOBIN GLYCOSYLATED A1C: CPT | Performed by: NURSE PRACTITIONER

## 2022-03-30 PROCEDURE — 71046 X-RAY EXAM CHEST 2 VIEWS: CPT

## 2022-03-30 PROCEDURE — 36415 COLL VENOUS BLD VENIPUNCTURE: CPT | Performed by: NURSE PRACTITIONER

## 2022-03-30 PROCEDURE — 80050 GENERAL HEALTH PANEL: CPT | Performed by: NURSE PRACTITIONER

## 2022-03-30 NOTE — PATIENT INSTRUCTIONS
Pt should meet with dietician for healthy food options. Clt should also continue to meet with psychologist and psychiatrist regularl.

## 2022-03-30 NOTE — PROGRESS NOTES
Pt is a  33yo female. Pt is being seen today for a mental health evaluation for weight loss surgery. Pt’s score on the Dangelo inventory for depression indicated normal levels for mood elevation. Pt’s score on the Dangelo inventory for anxiety indicated low anxiety. Pt’s results on the WHOQOL-BREF indicated that she was not satisfied with her sleep and has negative feelings about herself often.  Pt stated she’s tried several times to lose weight with OTC weight loss supplements such as Juan Carlos and slim-quick. Pt stated she also took phentermine for weight loss, but was not successful with keeping the weight off. Pt works 3rd shift and lives with her boyfriend and 3yo son. Pt stated she has a good support system that includes friends and her boyfriend. Pt stated she has some childhood trauma, but she feels she has worked through it and knows when she needs to talk with her psychology about issues. SW and pt were able to discuss these issues in detail with no concerns. Pt affect was appropriate and congruent with mood. Pt denied past attempts of suicide, and any current SI. Pt did state that she struggles with SI when triggered by a past memory or when stressed. Pt is currently seeing her psychiatrist every 6 months and her psychologist every 3 to 6 months. Pt stated she’s been diagnosed with ADHD, anxiety, depression, and rejection sensitive dysphoria. Pt is currently taking Vyvanse for ADHD, Wellbutrin for depression, and Lexapro for anxiety, and trazodone to help with sleep.  Pt stated she’s never had any inpt stays for psychiatric issues. Pt denied hearing voices. Pt denied any current substance abuse. Pt stated she has 1 alcoholic beverage a week. Pt denied smoking cigarettes, but stated she does occasionally smoke marijuana once a month. Pt is aware of the lifestyle changes she will need to make after getting the surgery. Pt demonstrates ability to follow the plan and expresses willingness to comply. ALLEN  recommends that clt meet with the dietician to get information on healthier food options. Pt should also continue to meet with her psychologist and psychiatrist regularly.          See patient chart for co-morbid conditions    Recommendation Notes: Based on the above, as well as the full bariatric surgery program offered by [facility], it is my opinion that this candidate, psychologically: is a suitable candidate for bariatric surgery

## 2022-04-01 ENCOUNTER — TELEPHONE (OUTPATIENT)
Dept: BARIATRICS/WEIGHT MGMT | Facility: CLINIC | Age: 32
End: 2022-04-01

## 2022-04-01 NOTE — TELEPHONE ENCOUNTER
Inform about results chest x-ray, EKG & labs  ----- Message from SALINA Mroton sent at 3/31/2022  7:34 AM EDT -----  Patient needs cardiac clearance. Please let me know if she needs an order for referral. Thx!

## 2022-04-05 DIAGNOSIS — R94.31 ABNORMAL EKG: ICD-10-CM

## 2022-04-05 DIAGNOSIS — E66.01 OBESITY, CLASS III, BMI 40-49.9 (MORBID OBESITY): Primary | ICD-10-CM

## 2022-04-05 DIAGNOSIS — Z01.818 PRE-OP EVALUATION: ICD-10-CM

## 2022-04-18 ENCOUNTER — OFFICE VISIT (OUTPATIENT)
Dept: CARDIOLOGY | Facility: CLINIC | Age: 32
End: 2022-04-18

## 2022-04-18 VITALS
BODY MASS INDEX: 40.71 KG/M2 | DIASTOLIC BLOOD PRESSURE: 80 MMHG | SYSTOLIC BLOOD PRESSURE: 110 MMHG | WEIGHT: 268.6 LBS | HEART RATE: 65 BPM | HEIGHT: 68 IN

## 2022-04-18 DIAGNOSIS — Z01.818 PRE-OP EXAM: Primary | ICD-10-CM

## 2022-04-18 DIAGNOSIS — R06.09 EXERTIONAL DYSPNEA: ICD-10-CM

## 2022-04-18 PROCEDURE — 93000 ELECTROCARDIOGRAM COMPLETE: CPT | Performed by: INTERNAL MEDICINE

## 2022-04-18 PROCEDURE — 99204 OFFICE O/P NEW MOD 45 MIN: CPT | Performed by: INTERNAL MEDICINE

## 2022-04-18 RX ORDER — CLOBETASOL PROPIONATE 0.5 MG/G
CREAM TOPICAL
COMMUNITY
Start: 2022-04-12 | End: 2022-11-16 | Stop reason: SDUPTHER

## 2022-04-18 NOTE — PROGRESS NOTES
[facility]      Patient Name: Melva MANCERA  :1990  Age: 32 y.o.  Encounter Provider:  Abdon Sanders Jr, MD      Chief Complaint:   Chief Complaint   Patient presents with   • Pre-op Exam         HPI  Melva MANCERA is a 32 y.o. female with past medical history of hypertension, asthma, morbid obesity and family history of coronary artery disease who presents for initial evaluation prior to consideration for bariatric surgery.  Patient has a very sedentary job but does not require much walking.  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  No angina.  No orthopnea, PND or edema.  No palpitations, dizziness or syncope.  Blood pressures been well controlled on current regimen.  Her father had a heart attack in his late 40s.  She is a lifelong non-smoker who drinks socially and denies illicit drug use.      The following portions of the patient's history were reviewed and updated as appropriate: allergies, current medications, past family history, past medical history, past social history, past surgical history and problem list.      Review of Systems   Constitutional: Negative for chills and fever.   HENT: Negative for hoarse voice and sore throat.    Eyes: Negative for double vision and photophobia.   Cardiovascular: Positive for dyspnea on exertion. Negative for chest pain, leg swelling, near-syncope, orthopnea, palpitations, paroxysmal nocturnal dyspnea and syncope.   Respiratory: Negative for cough and wheezing.    Skin: Negative for poor wound healing and rash.   Musculoskeletal: Negative for arthritis and joint swelling.   Gastrointestinal: Negative for bloating, abdominal pain, hematemesis and hematochezia.   Neurological: Negative for dizziness and focal weakness.   Psychiatric/Behavioral: Negative for depression and suicidal ideas.       OBJECTIVE:   Vital Signs  Vitals:    22 1128   BP: 110/80   Pulse: 65     Estimated body mass  "index is 41.45 kg/m² as calculated from the following:    Height as of this encounter: 171.5 cm (67.5\").    Weight as of this encounter: 122 kg (268 lb 9.6 oz).    Vitals reviewed.   Constitutional:       Appearance: Healthy appearance. Not in distress.   Neck:      Vascular: No JVR. JVD normal.   Pulmonary:      Effort: Pulmonary effort is normal.      Breath sounds: Normal breath sounds. No wheezing. No rhonchi. No rales.   Chest:      Chest wall: Not tender to palpatation.   Cardiovascular:      PMI at left midclavicular line. Normal rate. Regular rhythm. Normal S1. Normal S2.      Murmurs: There is no murmur.      No gallop. No click. No rub.   Pulses:     Intact distal pulses.   Edema:     Peripheral edema absent.   Abdominal:      General: Bowel sounds are normal.      Palpations: Abdomen is soft.      Tenderness: There is no abdominal tenderness.   Musculoskeletal: Normal range of motion.         General: No tenderness. Skin:     General: Skin is warm and dry.   Neurological:      General: No focal deficit present.      Mental Status: Alert and oriented to person, place and time.           ECG 12 Lead    Date/Time: 4/18/2022 3:59 PM  Performed by: Abdon Sanders Jr., MD  Authorized by: Abdon Sanders Jr., MD   Comparison: compared with previous ECG from 3/30/2022  Similar to previous ECG  Rhythm: sinus rhythm  Other findings: left ventricular hypertrophy    Clinical impression: abnormal EKG                  ASSESSMENT:     Exertional dyspnea  Abnormal EKG  Family history of premature coronary artery disease    PLAN OF CARE:     1. Exertional dyspnea -patient has poor functional capacity with abnormal EKG and family history premature coronary artery disease.  In the setting of high risk surgery we will plan for pharmacological nuclear stress study.  2. Abnormal EKG -possible LVH although could be normal variant for young patient this is different from previous EKG.  Stress study planned as above.  3. Family " history of premature coronary artery disease  4. Morbid obesity -planning for stress study in anticipation of bariatric surgery.    Return to clinic 6 months           Discharge Medications          Accurate as of April 18, 2022 11:28 AM. If you have any questions, ask your nurse or doctor.            Continue These Medications      Instructions Start Date   albuterol sulfate  (90 Base) MCG/ACT inhaler  Commonly known as: PROVENTIL HFA;VENTOLIN HFA;PROAIR HFA   No dose, route, or frequency recorded.      amLODIPine 5 MG tablet  Commonly known as: NORVASC   5 mg, Oral, Daily      buPROPion  MG 24 hr tablet  Commonly known as: WELLBUTRIN XL   150 mg, Oral, Every Morning      calcium carbonate 500 MG chewable tablet  Commonly known as: TUMS   1 tablet, Oral, As Needed      cetirizine 10 MG tablet  Commonly known as: zyrTEC   10 mg, Oral, Daily      clobetasol 0.05 % cream  Commonly known as: TEMOVATE   APPLY TO AFFECTED AREAS EVERY DAY AS NEEDED      Drysol 20 % external solution  Generic drug: aluminum chloride   APPLY SPARINGLY TO AFFECTED AREA EVERY DAY      econazole nitrate 1 % cream  Commonly known as: SPECTAZOLE   APPLY TOPICALLY 2 (TWO) TIMES DAILY APPLY TO AFFECTED AREA DAILY.      escitalopram 20 MG tablet  Commonly known as: LEXAPRO   20 mg, Oral, Every Morning      levonorgestrel 20 MCG/24HR IUD  Commonly known as: MIRENA   1 each, Intrauterine, Once      montelukast 10 MG tablet  Commonly known as: SINGULAIR   10 mg, Oral      SUMAtriptan 50 MG tablet  Commonly known as: IMITREX   PLEASE SEE ATTACHED FOR DETAILED DIRECTIONS      traZODone 50 MG tablet  Commonly known as: DESYREL   TAKE 1 OR 2 TABLETS BY MOUTH AT BEDTIME AS NEEDED FOR SLEEP      Vyvanse 50 MG capsule  Generic drug: lisdexamfetamine   50 mg, Oral, Every Morning             Thank you for allowing me to participate in the care of your patient,      Sincerely,   Abdon Sanders MD  [facility]  04/18/22  11:28  EDT

## 2022-05-02 ENCOUNTER — APPOINTMENT (OUTPATIENT)
Dept: SLEEP MEDICINE | Facility: HOSPITAL | Age: 32
End: 2022-05-02

## 2022-05-03 ENCOUNTER — HOSPITAL ENCOUNTER (OUTPATIENT)
Dept: SLEEP MEDICINE | Facility: HOSPITAL | Age: 32
Discharge: HOME | End: 2022-05-03
Admitting: NURSE PRACTITIONER

## 2022-05-03 DIAGNOSIS — E66.01 OBESITY, CLASS III, BMI 40-49.9 (MORBID OBESITY): ICD-10-CM

## 2022-05-03 DIAGNOSIS — R06.83 SNORING: ICD-10-CM

## 2022-05-03 DIAGNOSIS — Z01.818 PRE-OP EVALUATION: ICD-10-CM

## 2022-05-03 PROCEDURE — 95806 SLEEP STUDY UNATT&RESP EFFT: CPT

## 2022-05-03 PROCEDURE — 95806 SLEEP STUDY UNATT&RESP EFFT: CPT | Performed by: INTERNAL MEDICINE

## 2022-05-10 ENCOUNTER — OFFICE VISIT (OUTPATIENT)
Dept: OBSTETRICS AND GYNECOLOGY | Facility: CLINIC | Age: 32
End: 2022-05-10

## 2022-05-10 VITALS
DIASTOLIC BLOOD PRESSURE: 75 MMHG | BODY MASS INDEX: 40.62 KG/M2 | WEIGHT: 268 LBS | SYSTOLIC BLOOD PRESSURE: 108 MMHG | HEIGHT: 68 IN

## 2022-05-10 DIAGNOSIS — Z01.419 VISIT FOR GYNECOLOGIC EXAMINATION: Primary | ICD-10-CM

## 2022-05-10 DIAGNOSIS — R35.0 URINARY FREQUENCY: ICD-10-CM

## 2022-05-10 DIAGNOSIS — Z11.3 SCREENING FOR STD (SEXUALLY TRANSMITTED DISEASE): ICD-10-CM

## 2022-05-10 LAB
BILIRUB BLD-MCNC: ABNORMAL MG/DL
EXPIRATION DATE: ABNORMAL
GLUCOSE UR STRIP-MCNC: NEGATIVE MG/DL
KETONES UR QL: ABNORMAL
LEUKOCYTE EST, POC: ABNORMAL
Lab: ABNORMAL
NITRITE UR-MCNC: NEGATIVE MG/ML
PH UR: 5.5 [PH] (ref 5–8)
PROT UR STRIP-MCNC: NEGATIVE MG/DL
RBC # UR STRIP: ABNORMAL /UL
SP GR UR: 1.03 (ref 1–1.03)
UROBILINOGEN UR QL: NORMAL

## 2022-05-10 PROCEDURE — 81003 URINALYSIS AUTO W/O SCOPE: CPT | Performed by: OBSTETRICS & GYNECOLOGY

## 2022-05-10 PROCEDURE — 99212 OFFICE O/P EST SF 10 MIN: CPT | Performed by: OBSTETRICS & GYNECOLOGY

## 2022-05-10 PROCEDURE — 99395 PREV VISIT EST AGE 18-39: CPT | Performed by: OBSTETRICS & GYNECOLOGY

## 2022-05-10 NOTE — PROGRESS NOTES
[facility]  [address], [address]  [place] [address]  Phone: [phone] / Fax:  [phone]      05/10/2022    [facility] [place] [id number]    Reymundo Hernandez MD    Chief Complaint   Patient presents with   • Gynecologic Exam     Annual Exam, last pap 10-22-19 NL HPV (-). Patient with Mirena IUD in place 9-13-17, she would like replaced. Patient c/o frequent urination. CC U/A Small Bilirubin, Trace of Ketones, Small Blood & Trace of Leukocytes. Patient also requesting STD screening.        Melva MANCERA is here for annual gynecologic exam.  HPI - Patient with last pap 2.5 years ago.  She has an IUD in place for nearly 5 years and requests replacement.  She denies STD exposure but requests testing.  She describes urinary frequency and requests urinalysis.    Past Medical History:   Diagnosis Date   • ADHD    • Anxiety    • Asthma    • Bronchitis    • Chlamydia    • Chronic sinus infection    • COVID-19 01/2022   • Depression    • Eczema    • Gestational hypertension    • Urogenital trichomoniasis        Past Surgical History:   Procedure Laterality Date   • WISDOM TOOTH EXTRACTION         No Known Allergies    Social History     Socioeconomic History   • Marital status:    • Number of children: 1   Tobacco Use   • Smoking status: Never Smoker   • Smokeless tobacco: Never Used   Vaping Use   • Vaping Use: Never used   Substance and Sexual Activity   • Alcohol use: Yes     Comment: social   • Drug use: Yes     Types: Marijuana     Comment: occ   • Sexual activity: Yes     Partners: Male     Birth control/protection: I.U.D.       Family History   Problem Relation Age of Onset   • Heart attack Father    • Diabetes Father    • Cancer Mother         BLOOD   • Bipolar disorder Mother    • Hypertension Mother    • Anxiety disorder Sister    • Diabetes Paternal Grandmother    • Heart disease Maternal Grandmother    • Lung cancer Maternal Grandfather    • Alcohol abuse  "Maternal Uncle    • Drug abuse Maternal Uncle    • Breast cancer Neg Hx    • Colon cancer Neg Hx        No LMP recorded. (Menstrual status: Other).    OB History        1    Para   1    Term   1            AB        Living   1       SAB        IAB        Ectopic        Molar        Multiple   0    Live Births   1                Vitals:    05/10/22 1211   BP: 108/75   Weight: 122 kg (268 lb)   Height: 171.5 cm (67.52\")       Physical Exam  Constitutional:       Appearance: Normal appearance. She is well-developed.   Genitourinary:      Bladder and urethral meatus normal.      Right Labia: No tenderness or lesions.     Left Labia: No tenderness or lesions.     No vaginal discharge or tenderness.        Right Adnexa: not tender and not full.     Left Adnexa: not tender and not full.     No cervical motion tenderness or lesion.      IUD strings visualized.      Uterus is not enlarged or tender.      No urethral tenderness or hypermobility present.   Breasts:      Right: No mass or nipple discharge.      Left: No mass or nipple discharge.       HENT:      Right Ear: External ear normal.      Left Ear: External ear normal.      Nose: Nose normal.   Eyes:      Conjunctiva/sclera: Conjunctivae normal.   Neck:      Thyroid: No thyromegaly.   Cardiovascular:      Rate and Rhythm: Normal rate and regular rhythm.      Heart sounds: Normal heart sounds.   Pulmonary:      Effort: Pulmonary effort is normal.      Breath sounds: No stridor. No wheezing.   Abdominal:      Palpations: Abdomen is soft. There is no mass.      Tenderness: There is no guarding or rebound.   Musculoskeletal:         General: Normal range of motion.      Cervical back: Normal range of motion and neck supple.   Neurological:      Mental Status: She is alert.      Coordination: Coordination normal.   Skin:     General: Skin is warm and dry.   Psychiatric:         Mood and Affect: Mood normal.         Behavior: Behavior normal.         Thought " Content: Thought content normal.         Judgment: Judgment normal.   Vitals reviewed. Exam conducted with a chaperone present.         Diagnoses and all orders for this visit:    1. Visit for gynecologic examination (Primary)  -     IgP, Aptima HPV  -     Discussed importance of regular screening and breast awareness.  -     Discussed replacement of IUD.  -     Patient has been vaccinated against Covid 19.    2. Screening for STD (sexually transmitted disease)  -     Chlamydia trachomatis, Neisseria gonorrhoeae, Trichomonas vaginalis, PCR - Swab, Vagina  -     RPR  -     HIV-1 / O / 2 Ag / Antibody 4th Generation    3. Urinary frequency  -     Urine Culture - Urine, Urine, Clean Catch  -     Await culture.  Dipstick unremarkable.        Shane Long MD

## 2022-05-11 LAB
HIV 1+2 AB+HIV1 P24 AG SERPL QL IA: NON REACTIVE
RPR SER QL: NON REACTIVE

## 2022-05-12 ENCOUNTER — TELEPHONE (OUTPATIENT)
Dept: OBSTETRICS AND GYNECOLOGY | Facility: CLINIC | Age: 32
End: 2022-05-12

## 2022-05-12 LAB
BACTERIA UR CULT: NORMAL
BACTERIA UR CULT: NORMAL
C TRACH RRNA SPEC QL NAA+PROBE: NEGATIVE
N GONORRHOEA RRNA SPEC QL NAA+PROBE: NEGATIVE
T VAGINALIS RRNA SPEC QL NAA+PROBE: NEGATIVE

## 2022-05-13 ENCOUNTER — TELEPHONE (OUTPATIENT)
Dept: SLEEP MEDICINE | Facility: HOSPITAL | Age: 32
End: 2022-05-13

## 2022-05-18 ENCOUNTER — TELEPHONE (OUTPATIENT)
Dept: OBSTETRICS AND GYNECOLOGY | Facility: CLINIC | Age: 32
End: 2022-05-18

## 2022-05-18 NOTE — TELEPHONE ENCOUNTER
Left message for patient to call back. 5-26-22/lw     Left message for patient to call back. 5-24-22/lw     Called patient to inform her that her IUD is covered under pharmacy benefits and this has been received. She may be scheduled for removal with replacement. 5-18-22/lw     From: Abbie Puga ([facility])  Sent: Thursday, May 12, 2022 2:08 PM  To: Dalila Noyola ([facility]) <[email or web address]  Subject: IUD referral       The IUD for Melva Hdez   is covered and we will send it out as soon as possible! Thanks and have a great day.

## 2022-05-27 ENCOUNTER — TELEPHONE (OUTPATIENT)
Dept: BARIATRICS/WEIGHT MGMT | Facility: CLINIC | Age: 32
End: 2022-05-27

## 2022-06-14 ENCOUNTER — TELEPHONE (OUTPATIENT)
Dept: BARIATRICS/WEIGHT MGMT | Facility: CLINIC | Age: 32
End: 2022-06-14

## 2022-06-21 ENCOUNTER — OFFICE VISIT (OUTPATIENT)
Dept: OBSTETRICS AND GYNECOLOGY | Facility: CLINIC | Age: 32
End: 2022-06-21

## 2022-06-21 VITALS
WEIGHT: 268 LBS | HEIGHT: 68 IN | DIASTOLIC BLOOD PRESSURE: 78 MMHG | BODY MASS INDEX: 40.62 KG/M2 | SYSTOLIC BLOOD PRESSURE: 115 MMHG

## 2022-06-21 DIAGNOSIS — Z30.433 ENCOUNTER FOR IUD REMOVAL AND REINSERTION: Primary | ICD-10-CM

## 2022-06-21 LAB
B-HCG UR QL: NEGATIVE
EXPIRATION DATE: NORMAL
INTERNAL NEGATIVE CONTROL: NEGATIVE
INTERNAL POSITIVE CONTROL: POSITIVE
Lab: NORMAL

## 2022-06-21 PROCEDURE — 81025 URINE PREGNANCY TEST: CPT | Performed by: OBSTETRICS & GYNECOLOGY

## 2022-06-21 PROCEDURE — 58301 REMOVE INTRAUTERINE DEVICE: CPT | Performed by: OBSTETRICS & GYNECOLOGY

## 2022-06-21 PROCEDURE — 58300 INSERT INTRAUTERINE DEVICE: CPT | Performed by: OBSTETRICS & GYNECOLOGY

## 2022-06-21 RX ORDER — PROPRANOLOL HYDROCHLORIDE 80 MG/1
CAPSULE, EXTENDED RELEASE ORAL
COMMUNITY
Start: 2022-06-06

## 2022-06-21 NOTE — PROGRESS NOTES
IUD Removal and Insertion Procedure Note    IUD Removal    Type of IUD:  Mirena  Date of insertion:  9/13/2017  Reason for removal:  Device expiration  Other relevant history/information:  none    Procedure Time Documentation  The risks of the procedure were reviewed with the patient including bleeding, infection and unlikely damage to the uterus and the benefits of the procedure were explained to the patient and Verbal informed consent was obtained    Procedure Details  IUD strings visible:  yes  Local anesthesia:  None  Tenaculum used:  Yes, single tooth  Removal:  IUD strings grasped and IUD removed intact with gentle traction.  The patient tolerated the procedure well.    IUD Insertion    Indication: Desires long acting reversible contraception     Procedure Details   Urine pregnancy test was not done.  The risks (including infection, bleeding, pain, and uterine perforation) and benefits of the procedure were explained to the patient and Verbal informed consent was obtained.      Cervix cleansed with Betadine. Uterus sounded to 7 cm. IUD inserted without difficulty. String visible and trimmed.    IUD Information:  Mirena, Lot # [id number], Expiration date 9/2024, NDC 60762-650-38.    Condition:  Stable    Complications:  None noted and Patient tolerated the procedure well without complications.    Plan:  The patient was advised to call for any fever or for prolonged or severe pain or bleeding. She was advised to use NSAID as needed for mild to moderate pain.   Patient to avoid intercourse for one week.  Follow up for IUD check in 4 weeks.    Shane Long MD  6/21/2022 17:36 EDT

## 2022-06-27 ENCOUNTER — TELEPHONE (OUTPATIENT)
Dept: OBSTETRICS AND GYNECOLOGY | Facility: CLINIC | Age: 32
End: 2022-06-27

## 2022-06-27 NOTE — TELEPHONE ENCOUNTER
Melinda    Let her know that some bleeding and cramping within a week of IUD placement can be normal.      She can be added to my schedule tomorrow at 9 am if she would like me to check her out.    Thanks    Ethan

## 2022-06-27 NOTE — TELEPHONE ENCOUNTER
Caller: Melva MANCERA     Relationship: [unfilled]     Best call back number: [phone]    What is your medical concern? BLEEDING, CRAMPING AND WEAKNESS    How long has this issue been going on? 6.22.22    Is your provider already aware of this issue? NO    PT HAD IUD PLACED ON 6.21.22 AND IS EXPERIENCING BLEEDING-2 PANTY LINERS IN 8 HRS-, CRAMPING, AND WEAKNESS. PT STATED HER BOYFRIEND RACHEL CAN BE CALLED [phone].     Have you been treated for this issue? NO

## 2022-06-27 NOTE — TELEPHONE ENCOUNTER
Durga Long,  Pt had an IUD inserted 6/21. Since 6/22, she is bleeding, cramping & feels weak. She is changing 2 panty liners in 8 hours. Please advise.  Thank you,  Ladi

## 2022-06-27 NOTE — TELEPHONE ENCOUNTER
Correction: Not a high priority message- I apologize for the error!    Hi Dr. Long,  Pt had an IUD inserted 6/21. Since 6/22, she is bleeding, cramping & feels weak. She is changing 2 panty liners in 8 hours. Please advise.  Thank you,  Ladi

## 2022-06-28 ENCOUNTER — OFFICE VISIT (OUTPATIENT)
Dept: OBSTETRICS AND GYNECOLOGY | Facility: CLINIC | Age: 32
End: 2022-06-28

## 2022-06-28 VITALS
SYSTOLIC BLOOD PRESSURE: 106 MMHG | BODY MASS INDEX: 40.32 KG/M2 | HEIGHT: 68 IN | WEIGHT: 266 LBS | DIASTOLIC BLOOD PRESSURE: 65 MMHG

## 2022-06-28 DIAGNOSIS — N89.8 VAGINAL DISCHARGE: ICD-10-CM

## 2022-06-28 DIAGNOSIS — Z30.431 IUD CHECK UP: Primary | ICD-10-CM

## 2022-06-28 PROCEDURE — 99213 OFFICE O/P EST LOW 20 MIN: CPT | Performed by: OBSTETRICS & GYNECOLOGY

## 2022-06-28 RX ORDER — DIPHENOXYLATE HYDROCHLORIDE AND ATROPINE SULFATE 2.5; .025 MG/1; MG/1
TABLET ORAL
COMMUNITY

## 2022-06-28 NOTE — PROGRESS NOTES
Subjective   Melva MANCERA is a 32 y.o. female.     Cc:  IUD check    History of Present Illness - 32 year old female with Mirena IUD placed one week ago.  Patient with vaginal cramping and vaginal discharge since IUD placed one week ago.  She also reports some fatigue; no fevers or chills.    The following portions of the patient's history were reviewed and updated as appropriate:   She  has a past medical history of ADHD, Anxiety, Asthma, Bronchitis, Chlamydia, Chronic sinus infection, COVID-19 (01/2022), Depression, Eczema, Gestational hypertension, and Urogenital trichomoniasis.  She  reports that she has never smoked. She has never used smokeless tobacco. She reports current alcohol use. She reports current drug use. Drug: Marijuana.  Current Outpatient Medications   Medication Sig Dispense Refill   • albuterol sulfate  (90 Base) MCG/ACT inhaler      • amLODIPine (NORVASC) 5 MG tablet Take 5 mg by mouth Daily.  1   • buPROPion XL (WELLBUTRIN XL) 150 MG 24 hr tablet Take 150 mg by mouth Every Morning.     • cetirizine (zyrTEC) 10 MG tablet Take 10 mg by mouth Daily.     • clobetasol (TEMOVATE) 0.05 % cream APPLY TO AFFECTED AREAS EVERY DAY AS NEEDED     • DRYSOL 20 % external solution APPLY SPARINGLY TO AFFECTED AREA EVERY DAY     • econazole nitrate (SPECTAZOLE) 1 % cream APPLY TOPICALLY 2 (TWO) TIMES DAILY APPLY TO AFFECTED AREA DAILY.     • econazole nitrate (SPECTAZOLE) 1 % cream Apply  topically to the appropriate area as directed.     • econazole nitrate (SPECTAZOLE) 1 % cream Apply 1 application topically to the appropriate area as directed 2 (Two) Times a Day.     • escitalopram (LEXAPRO) 20 MG tablet Take 20 mg by mouth Every Morning.  1   • levonorgestrel (MIRENA) 20 MCG/24HR IUD 1 each by Intrauterine route 1 (One) Time.     • Mirena, 52 MG, 20 MCG/DAY intrauterine device IUD To be inserted one time by prescriber; route intrauterine. 1 each 0   • montelukast (SINGULAIR) 10 MG tablet Take 10  mg by mouth.     • multivitamin (MULTI-VITAMIN DAILY PO) Take  by mouth.     • propranolol LA (INDERAL LA) 80 MG 24 hr capsule TAKE 1 CAPSULE (80 MG TOTAL) BY MOUTH 1 (ONE) TIME EACH DAY. DO NOT CRUSH, CHEW, OR SPLIT.     • SUMAtriptan (IMITREX) 50 MG tablet PLEASE SEE ATTACHED FOR DETAILED DIRECTIONS     • traZODone (DESYREL) 50 MG tablet TAKE 1 OR 2 TABLETS BY MOUTH AT BEDTIME AS NEEDED FOR SLEEP     • VYVANSE 50 MG capsule Take 50 mg by mouth Every Morning  0     No current facility-administered medications for this visit.     She has No Known Allergies..    Review of Systems   Constitutional: Negative for chills and fever.   Genitourinary: Positive for pelvic pain and vaginal discharge. Negative for dysuria.       Objective   Physical Exam  Vitals reviewed. Exam conducted with a chaperone present.   Constitutional:       Appearance: Normal appearance. She is not ill-appearing.   Genitourinary:     General: Normal vulva.      Exam position: Lithotomy position.      Labia:         Right: No rash or tenderness.         Left: No rash or tenderness.       Vagina: Vaginal discharge present.      Cervix: Normal.      Comments: IUD strings well visualized  Neurological:      Mental Status: She is alert.   Psychiatric:         Mood and Affect: Mood normal.         Behavior: Behavior normal.         Thought Content: Thought content normal.         Judgment: Judgment normal.         Assessment & Plan   Diagnoses and all orders for this visit:    1. IUD check up (Primary)        -     Reassurance give.  Continue NSAIDs.  2. Vaginal discharge  -     NuSwab VG+ - Swab, Vagina  -     Await cultures and treat based on findings.       Shane Long MD

## 2022-06-30 ENCOUNTER — TELEPHONE (OUTPATIENT)
Dept: OBSTETRICS AND GYNECOLOGY | Facility: CLINIC | Age: 32
End: 2022-06-30

## 2022-07-01 ENCOUNTER — TELEPHONE (OUTPATIENT)
Dept: BARIATRICS/WEIGHT MGMT | Facility: CLINIC | Age: 32
End: 2022-07-01

## 2022-07-01 NOTE — TELEPHONE ENCOUNTER
Attempted to call patient to answer her question about rescheduling scope. She needs her stress test completed and cardiac clearance before her scope is done.

## 2022-07-19 ENCOUNTER — OFFICE VISIT (OUTPATIENT)
Dept: OBSTETRICS AND GYNECOLOGY | Facility: CLINIC | Age: 32
End: 2022-07-19

## 2022-07-19 VITALS — BODY MASS INDEX: 40.32 KG/M2 | HEIGHT: 68 IN | WEIGHT: 266 LBS

## 2022-07-19 DIAGNOSIS — Z30.431 IUD CHECK UP: Primary | ICD-10-CM

## 2022-07-19 PROCEDURE — 99212 OFFICE O/P EST SF 10 MIN: CPT | Performed by: OBSTETRICS & GYNECOLOGY

## 2022-07-19 NOTE — PROGRESS NOTES
Subjective   Melva MANCERA is a 32 y.o. female.     Cc:  IUD check    History of Present Illness - Patient is a 32 year old female who underwent Mirena IUD placement last month.  She has no major complaints.  She denies noted expulsion of IUD.    The following portions of the patient's history were reviewed and updated as appropriate:   She  has a past medical history of ADHD, Anxiety, Asthma, Bronchitis, Chlamydia, Chronic sinus infection, COVID-19 (01/2022), Depression, Eczema, Gestational hypertension, and Urogenital trichomoniasis.  She  reports that she has never smoked. She has never used smokeless tobacco. She reports current alcohol use. She reports current drug use. Drug: Marijuana.  Current Outpatient Medications   Medication Sig Dispense Refill   • albuterol sulfate  (90 Base) MCG/ACT inhaler      • amLODIPine (NORVASC) 5 MG tablet Take 5 mg by mouth Daily.  1   • buPROPion XL (WELLBUTRIN XL) 150 MG 24 hr tablet Take 150 mg by mouth Every Morning.     • cetirizine (zyrTEC) 10 MG tablet Take 10 mg by mouth Daily.     • clobetasol (TEMOVATE) 0.05 % cream APPLY TO AFFECTED AREAS EVERY DAY AS NEEDED     • DRYSOL 20 % external solution APPLY SPARINGLY TO AFFECTED AREA EVERY DAY     • econazole nitrate (SPECTAZOLE) 1 % cream APPLY TOPICALLY 2 (TWO) TIMES DAILY APPLY TO AFFECTED AREA DAILY.     • escitalopram (LEXAPRO) 20 MG tablet Take 20 mg by mouth Every Morning.  1   • levonorgestrel (MIRENA) 20 MCG/24HR IUD 1 each by Intrauterine route 1 (One) Time.     • montelukast (SINGULAIR) 10 MG tablet Take 10 mg by mouth.     • multivitamin (THERAGRAN) tablet tablet Take  by mouth.     • propranolol LA (INDERAL LA) 80 MG 24 hr capsule TAKE 1 CAPSULE (80 MG TOTAL) BY MOUTH 1 (ONE) TIME EACH DAY. DO NOT CRUSH, CHEW, OR SPLIT.     • SUMAtriptan (IMITREX) 50 MG tablet PLEASE SEE ATTACHED FOR DETAILED DIRECTIONS     • traZODone (DESYREL) 50 MG tablet TAKE 1 OR 2 TABLETS BY MOUTH AT BEDTIME AS NEEDED FOR SLEEP      • VYVANSE 50 MG capsule Take 50 mg by mouth Every Morning  0   • econazole nitrate (SPECTAZOLE) 1 % cream Apply  topically to the appropriate area as directed.     • econazole nitrate (SPECTAZOLE) 1 % cream Apply 1 application topically to the appropriate area as directed 2 (Two) Times a Day.     • Mirena, 52 MG, 20 MCG/DAY intrauterine device IUD To be inserted one time by prescriber; route intrauterine. 1 each 0     No current facility-administered medications for this visit.     She has No Known Allergies..    Review of Systems   Genitourinary: Negative for menstrual problem, pelvic pain, vaginal bleeding and vaginal discharge.       Objective   Physical Exam  Vitals reviewed. Exam conducted with a chaperone present.   Constitutional:       Appearance: Normal appearance.   Genitourinary:     General: Normal vulva.      Exam position: Lithotomy position.      Labia:         Right: No rash or tenderness.         Left: No rash or tenderness.       Urethra: No prolapse.      Vagina: Normal.      Cervix: Normal.      Comments: IUD strings noted and trimmed  Neurological:      Mental Status: She is alert.   Psychiatric:         Mood and Affect: Mood normal.         Behavior: Behavior normal.         Thought Content: Thought content normal.         Judgment: Judgment normal.         Assessment & Plan   Diagnoses and all orders for this visit:    1. IUD check up (Primary)  -  Reassurance given.  Follow up in one year.    Shane Long MD

## 2022-09-06 ENCOUNTER — TELEPHONE (OUTPATIENT)
Dept: BARIATRICS/WEIGHT MGMT | Facility: CLINIC | Age: 32
End: 2022-09-06

## 2022-11-16 ENCOUNTER — OFFICE VISIT (OUTPATIENT)
Dept: OBSTETRICS AND GYNECOLOGY | Facility: CLINIC | Age: 32
End: 2022-11-16

## 2022-11-16 ENCOUNTER — DOCUMENTATION (OUTPATIENT)
Dept: OBSTETRICS AND GYNECOLOGY | Facility: CLINIC | Age: 32
End: 2022-11-16

## 2022-11-16 ENCOUNTER — TELEPHONE (OUTPATIENT)
Dept: OBSTETRICS AND GYNECOLOGY | Facility: CLINIC | Age: 32
End: 2022-11-16

## 2022-11-16 VITALS
HEIGHT: 68 IN | BODY MASS INDEX: 40.92 KG/M2 | WEIGHT: 270 LBS | SYSTOLIC BLOOD PRESSURE: 132 MMHG | DIASTOLIC BLOOD PRESSURE: 87 MMHG

## 2022-11-16 DIAGNOSIS — N76.0 VULVOVAGINITIS: Primary | ICD-10-CM

## 2022-11-16 PROCEDURE — 99213 OFFICE O/P EST LOW 20 MIN: CPT | Performed by: OBSTETRICS & GYNECOLOGY

## 2022-11-16 RX ORDER — LIDOCAINE HYDROCHLORIDE 30 MG/G
CREAM TOPICAL
Qty: 28 G | Refills: 1 | Status: SHIPPED | OUTPATIENT
Start: 2022-11-16 | End: 2022-11-18 | Stop reason: SDUPTHER

## 2022-11-16 RX ORDER — KETOCONAZOLE 20 MG/ML
SHAMPOO TOPICAL
COMMUNITY
Start: 2022-11-06

## 2022-11-16 RX ORDER — TRIFAROTENE 50 UG/G
CREAM TOPICAL
COMMUNITY
Start: 2022-09-01

## 2022-11-16 RX ORDER — MONTELUKAST SODIUM 10 MG/1
10 TABLET ORAL DAILY
COMMUNITY
Start: 2022-10-01

## 2022-11-16 RX ORDER — CLOBETASOL PROPIONATE 0.46 MG/ML
SOLUTION TOPICAL
COMMUNITY
Start: 2022-11-06

## 2022-11-16 RX ORDER — AMOXICILLIN 500 MG/1
CAPSULE ORAL
COMMUNITY
Start: 2022-10-30

## 2022-11-16 RX ORDER — ACYCLOVIR 50 MG/G
OINTMENT TOPICAL
Qty: 15 G | Refills: 0 | Status: SHIPPED | OUTPATIENT
Start: 2022-11-16 | End: 2022-11-30

## 2022-11-16 NOTE — TELEPHONE ENCOUNTER
Durga Long,  Pt called she is having severe pain with urination. Can I schedule for her to leave a urine sample today?    Thanks,  Gisselle

## 2022-11-16 NOTE — TELEPHONE ENCOUNTER
"Pt sent this  Fanzila message yesterday with more information. Can I schedule her with you today?    \"Please fit me in tomorrow! I have extreme pain when I pee. I have a UTI and have been taking antibiotics since November 7th to no avail. They gave me amoxicillin bc the uti I have is strep B and will only respond to penicillin. I have taken AZO urinary pain relief and that helped for a little bit. I have missed the past two days of work bc of the extreme amount of pain. It hurts to walk sit down and of course to pee. Again please see me if you can. Antione what else to do. \"  "

## 2022-11-17 ENCOUNTER — PATIENT MESSAGE (OUTPATIENT)
Dept: OBSTETRICS AND GYNECOLOGY | Facility: CLINIC | Age: 32
End: 2022-11-17

## 2022-11-17 ENCOUNTER — TELEPHONE (OUTPATIENT)
Dept: OBSTETRICS AND GYNECOLOGY | Facility: CLINIC | Age: 32
End: 2022-11-17

## 2022-11-17 LAB
HIV 1+2 AB+HIV1 P24 AG SERPL QL IA: NON REACTIVE
HSV1 IGG SER IA-ACNC: <0.91 INDEX (ref 0–0.9)
HSV2 IGG SER IA-ACNC: <0.91 INDEX (ref 0–0.9)
RPR SER QL: NORMAL

## 2022-11-17 NOTE — TELEPHONE ENCOUNTER
Melinda    Please call pharmacy and change to what she states will be covered.    Please also remind her yesterday that I recommended over the counter Oragel is acceptable; if she has a large copay for the Lidocaine as a prescription, Oragel is cheaper and ok to use, as I recommended yesterday.    Thanks    Ethan

## 2022-11-17 NOTE — TELEPHONE ENCOUNTER
My Chart. Last seen for IUD check 7/19/22. On call Dr Quinn sent Rx for 3% lidocaine to [facility] pharmacy [address] and requesting Lidocaine 5% ointment viscous lidocaine 2%. Her insurance would not cover the 3% and she is in extreme pain when urinating. Thank you

## 2022-11-17 NOTE — TELEPHONE ENCOUNTER
----- Message from Melinda Ramsay RN sent at 11/17/2022  9:00 AM EST -----  Regarding: Apparently my insurance wouldn’t cover this  Contact: [phone]  My Chart. Last seen for IUD check 7/19/22. On call Dr Quinn sent Rx for 3% lidocaine to [facility] pharmacy [address] and requesting Lidocaine 5% ointment viscous lidocaine 2%. Her insurance would not cover the 3% and she is in extreme pain when uri  nating. Thank you      ----- Message -----  From: Melva MANCERA  Sent: 11/17/2022   2:15 AM EST  To: Richard Camarena [facility]  Subject: Apparently my insurance wouldn’t cover this      Lidocaine prescription 3% that your on call doctor prescribed me. Is there any way you can prescribe me lidocaine 5% ointment   Viscous lidocaine 2%.  I still can’t pee without being in extreme pain. Please help.

## 2022-11-17 NOTE — TELEPHONE ENCOUNTER
Call to [facility] and requested to speak with pharmacist, but it went to voicemail. Left voicemail for Dr Long to change Rx for lidocaine cream 3% sent yesterday by Dr Quinn was not covered by her insurance. Therefore changing to 5% lidocaine 28g with 1 refill to apply tid,prn pain. I left our phone number as well, if there is a problem. Thank you.

## 2022-11-17 NOTE — PROGRESS NOTES
Subjective   Melva MANCERA is a 32 y.o. female.     Cc:  Burning with urination    History of Present Illness - Patient is a 32 year old female who presents with vaginal burning with urination for the past 2 weeks.  She was tried on empiric antibiotics (Amoxicillin) with not much improvement.  She is sexual activity and routinely uses condoms.  She denies STD exposure.    The following portions of the patient's history were reviewed and updated as appropriate:   She  has a past medical history of ADHD, Anxiety, Asthma, Bronchitis, Chlamydia, Chronic sinus infection, COVID-19 (01/2022), Depression, Eczema, Gestational hypertension, Urinary tract infection, and Urogenital trichomoniasis.  Her family history includes Alcohol abuse in her maternal uncle; Anxiety disorder in her sister; Bipolar disorder in her mother; Cancer in her mother; Diabetes in her father and paternal grandmother; Drug abuse in her maternal uncle; Heart attack in her father; Heart disease in her maternal grandmother; Hypertension in her mother; Lung cancer in her maternal grandfather.  She  reports that she has never smoked. She has never used smokeless tobacco. She reports current alcohol use. She reports current drug use. Drug: Marijuana.  Current Outpatient Medications   Medication Sig Dispense Refill   • montelukast (SINGULAIR) 10 MG tablet Take 10 mg by mouth Daily.     • propranolol XL (INNOPRAN XL) 80 MG 24 hr capsule TAKE 1 CAPSULE (80 MG TOTAL) BY MOUTH 1 (ONE) TIME EACH DAY. DO NOT CRUSH, CHEW, OR SPLIT.     • acyclovir (Zovirax) 5 % ointment Apply thin layer to affected area 15 g 0   • Aklief 0.005 % cream APPLY TO FACE EACH NIGHT AT BEDTIME     • albuterol sulfate  (90 Base) MCG/ACT inhaler      • amLODIPine (NORVASC) 5 MG tablet Take 5 mg by mouth Daily.  1   • amoxicillin (AMOXIL) 500 MG capsule TAKE 1 CAPSULE BY MOUTH THREE TIMES A DAY FOR 10 DAYS     • buPROPion XL (WELLBUTRIN XL) 150 MG 24 hr tablet Take 150 mg by mouth  Every Morning.     • cetirizine (zyrTEC) 10 MG tablet Take 10 mg by mouth Daily.     • clobetasol (TEMOVATE) 0.05 % external solution APPLY TO SCALP EVERYDAY AS NEEDED     • DRYSOL 20 % external solution APPLY SPARINGLY TO AFFECTED AREA EVERY DAY     • econazole nitrate (SPECTAZOLE) 1 % cream APPLY TOPICALLY 2 (TWO) TIMES DAILY APPLY TO AFFECTED AREA DAILY.     • econazole nitrate (SPECTAZOLE) 1 % cream Apply  topically to the appropriate area as directed.     • econazole nitrate (SPECTAZOLE) 1 % cream Apply 1 application topically to the appropriate area as directed 2 (Two) Times a Day.     • escitalopram (LEXAPRO) 20 MG tablet Take 20 mg by mouth Every Morning.  1   • ketoconazole (NIZORAL) 2 % shampoo WASH IN SHOWER 2 TIMES PER WEEK     • levonorgestrel (MIRENA) 20 MCG/24HR IUD 1 each by Intrauterine route 1 (One) Time.     • Mirena, 52 MG, 20 MCG/DAY intrauterine device IUD To be inserted one time by prescriber; route intrauterine. 1 each 0   • lidocaine 3 % cream cream Apply to affected area tid prn, pain 28 g 1   • montelukast (SINGULAIR) 10 MG tablet Take 10 mg by mouth.     • multivitamin (THERAGRAN) tablet tablet Take  by mouth.     • propranolol LA (INDERAL LA) 80 MG 24 hr capsule TAKE 1 CAPSULE (80 MG TOTAL) BY MOUTH 1 (ONE) TIME EACH DAY. DO NOT CRUSH, CHEW, OR SPLIT.     • SUMAtriptan (IMITREX) 50 MG tablet PLEASE SEE ATTACHED FOR DETAILED DIRECTIONS     • traZODone (DESYREL) 50 MG tablet TAKE 1 OR 2 TABLETS BY MOUTH AT BEDTIME AS NEEDED FOR SLEEP     • VYVANSE 50 MG capsule Take 50 mg by mouth Every Morning  0     No current facility-administered medications for this visit.     She has No Known Allergies..    Review of Systems   Constitutional: Negative for chills and fever.   Genitourinary: Negative for menstrual problem and vaginal discharge.       Objective   Physical Exam  Vitals reviewed. Exam conducted with a chaperone present.   Constitutional:       Appearance: Normal appearance.  "  Genitourinary:     Exam position: Lithotomy position.      Labia:         Right: No rash.         Left: Tenderness and lesion present.       Urethra: No prolapse or urethral swelling.      Vagina: Vaginal discharge present.      Cervix: Normal.   Neurological:      Mental Status: She is alert.   Psychiatric:         Mood and Affect: Mood normal.         Behavior: Behavior normal.         Thought Content: Thought content normal.         Judgment: Judgment normal.         Assessment & Plan   Diagnoses and all orders for this visit:    1. Vulvovaginitis (Primary)  -     NuSwab VG+ - Swab, Vagina  -     Herpes Simplex Virus (HSV) 1 & 2, JERAMIE - Swab, Cervix  -     HIV-1 / O / 2 Ag / Antibody 4th Generation  -     RPR  -     HSV 1 & 2 - Specific Antibody, IgG  -     acyclovir (Zovirax) 5 % ointment; Apply thin layer to affected area  Dispense: 15 g; Refill: 0  -     Patient with multiple ulcerative lesions on upper labia consistent with HSV.  Swabs performed.  Intention for IgM and IgG testing to make some attempt at primary infection but the lab cannot run IgM testing because of national shortage of \"reagent.\"  Will test IgG, STD testing, and vulva swab.  Recommended application of Zovirax and topical anesthetic while awaiting cultures.       Shane Long MD         "

## 2022-11-18 ENCOUNTER — HOSPITAL ENCOUNTER (EMERGENCY)
Facility: HOSPITAL | Age: 32
Discharge: HOME | End: 2022-11-18
Attending: EMERGENCY MEDICINE | Admitting: EMERGENCY MEDICINE

## 2022-11-18 VITALS
DIASTOLIC BLOOD PRESSURE: 102 MMHG | BODY MASS INDEX: 41.64 KG/M2 | SYSTOLIC BLOOD PRESSURE: 133 MMHG | TEMPERATURE: 98.5 F | WEIGHT: 270 LBS | RESPIRATION RATE: 18 BRPM | HEART RATE: 86 BPM | OXYGEN SATURATION: 96 %

## 2022-11-18 DIAGNOSIS — A60.04 HERPES SIMPLEX VULVOVAGINITIS: Primary | ICD-10-CM

## 2022-11-18 PROCEDURE — 96372 THER/PROPH/DIAG INJ SC/IM: CPT

## 2022-11-18 PROCEDURE — 25010000002 KETOROLAC TROMETHAMINE PER 15 MG: Performed by: EMERGENCY MEDICINE

## 2022-11-18 PROCEDURE — 99283 EMERGENCY DEPT VISIT LOW MDM: CPT

## 2022-11-18 PROCEDURE — 51798 US URINE CAPACITY MEASURE: CPT

## 2022-11-18 RX ORDER — ACYCLOVIR 400 MG/1
200 TABLET ORAL ONCE
Status: COMPLETED | OUTPATIENT
Start: 2022-11-18 | End: 2022-11-18

## 2022-11-18 RX ORDER — ACYCLOVIR 400 MG/1
400 TABLET ORAL
Qty: 35 TABLET | Refills: 0 | Status: SHIPPED | OUTPATIENT
Start: 2022-11-18 | End: 2022-11-28 | Stop reason: SDUPTHER

## 2022-11-18 RX ORDER — HYDROCODONE BITARTRATE AND ACETAMINOPHEN 5; 325 MG/1; MG/1
1 TABLET ORAL ONCE
Status: COMPLETED | OUTPATIENT
Start: 2022-11-18 | End: 2022-11-18

## 2022-11-18 RX ORDER — LIDOCAINE 40 MG/G
1 CREAM TOPICAL AS NEEDED
Status: DISCONTINUED | OUTPATIENT
Start: 2022-11-18 | End: 2022-11-18 | Stop reason: HOSPADM

## 2022-11-18 RX ORDER — LIDOCAINE HYDROCHLORIDE 30 MG/G
CREAM TOPICAL
Qty: 28 G | Refills: 0 | Status: SHIPPED | OUTPATIENT
Start: 2022-11-18

## 2022-11-18 RX ORDER — KETOROLAC TROMETHAMINE 15 MG/ML
15 INJECTION, SOLUTION INTRAMUSCULAR; INTRAVENOUS ONCE
Status: COMPLETED | OUTPATIENT
Start: 2022-11-18 | End: 2022-11-18

## 2022-11-18 RX ORDER — KETOROLAC TROMETHAMINE 15 MG/ML
15 INJECTION, SOLUTION INTRAMUSCULAR; INTRAVENOUS ONCE
Status: DISCONTINUED | OUTPATIENT
Start: 2022-11-18 | End: 2022-11-18

## 2022-11-18 RX ADMIN — HYDROCODONE BITARTRATE AND ACETAMINOPHEN 1 TABLET: 5; 325 TABLET ORAL at 16:51

## 2022-11-18 RX ADMIN — LIDOCAINE 1 APPLICATION: 40 CREAM TOPICAL at 18:07

## 2022-11-18 RX ADMIN — ACYCLOVIR 200 MG: 400 TABLET ORAL at 17:23

## 2022-11-18 RX ADMIN — KETOROLAC TROMETHAMINE 15 MG: 15 INJECTION, SOLUTION INTRAMUSCULAR; INTRAVENOUS at 20:19

## 2022-11-18 NOTE — ED TRIAGE NOTES
Pt is having an outbreak of herpes and is unable to uninate very much x days.  She has lower abd pain    Patient was placed in face mask during first look triage.  Patient was wearing a face mask throughout encounter.  I wore personal protective equipment throughout the encounter.  Hand hygiene was performed before and after patient encounter.

## 2022-11-18 NOTE — ED PROVIDER NOTES
EMERGENCY DEPARTMENT ENCOUNTER    Room Number:  14/14  Date of encounter:  11/18/2022  PCP: Reymundo Hernandez MD  Historian: Patient    Patient was placed in face mask during triage process. Patient was wearing facemask when I entered the room and throughout our encounter. I wore full protective equipment throughout this patient encounter including a face mask, eye protection, and gloves. Hand hygiene was performed before donning protective equipment and again following doffing of PPE after leaving the room.    HPI:  Chief Complaint:  irritation with difficulty urinating  A complete HPI/ROS/PMH/PSH/SH/FH are unobtainable due to: N/A   Context: Melva MANCERA is a 32 y.o. female who presents to the ED c/o suprapubic discomfort as she feels like she cannot pee.  Patient is experiencing her first ever outbreak of genital herpes that began about 5 or 6 days ago.  She was seen 3 days ago by her OB/GYN who prescribed her topical lidocaine cream and Zovirax cream which she has not been able to obtain from the pharmacy.  She had been able to sit in the bathtub and urinate to help with the pain but states she feels like she cannot pass urine at all with multiple tries.  When she was able to pass urine it was exquisitely painful.  She is very uncomfortable at this time and just wants the pain to go away so that she can urinate.  No reported fevers, nausea vomiting or diarrhea.      MEDICAL HISTORY REVIEW  EMR reviewed:    PAST MEDICAL HISTORY  Active Ambulatory Problems     Diagnosis Date Noted   • Vitamin D deficiency 05/14/2014   • Benign essential hypertension 01/07/2019   • Obesity, Class III, BMI 40-49.9 (morbid obesity) (HCC) 03/29/2022   • Dietary counseling 03/29/2022   • Pre-op evaluation 03/29/2022   • Depression with anxiety 03/29/2022   • ADHD 03/29/2022   • Asthma 03/29/2022   • Edema 03/29/2022   • Snoring 03/29/2022   • Multiple joint pain 03/29/2022   • GERD (gastroesophageal reflux disease) 03/29/2022      Resolved Ambulatory Problems     Diagnosis Date Noted   • Asthma affecting pregnancy, antepartum 10/19/2016   • Encounter for supervision of normal first pregnancy in third trimester 10/19/2016   • Screening for HIV (human immunodeficiency virus) 10/19/2016   • Screen for STD (sexually transmitted disease) 10/19/2016   • Screening for diabetes mellitus 02/16/2017   • Need for Tdap vaccination 04/05/2017   • Mild pre-eclampsia in third trimester 05/17/2017   • Pregnancy 06/05/2017   • Group B Streptococcus carrier, +RV culture, currently pregnant 06/11/2017   • Pre-eclampsia, mild, antepartum 06/12/2017   • Normal delivery at term 06/12/2017   • Postpartum exam 07/26/2017   • Birth control counseling 07/26/2017   • Encounter for IUD insertion 09/13/2017   • IUD check up 10/12/2017   • Encounter for gynecological examination 06/14/2018   • IUD threads lost 06/14/2018     Past Medical History:   Diagnosis Date   • Anxiety    • Bronchitis    • Chlamydia    • Chronic sinus infection    • COVID-19 01/2022   • Depression    • Eczema    • Gestational hypertension    • Urinary tract infection    • Urogenital trichomoniasis          PAST SURGICAL HISTORY  Past Surgical History:   Procedure Laterality Date   • WISDOM TOOTH EXTRACTION           FAMILY HISTORY  Family History   Problem Relation Age of Onset   • Heart attack Father    • Diabetes Father    • Cancer Mother         BLOOD   • Bipolar disorder Mother    • Hypertension Mother    • Anxiety disorder Sister    • Diabetes Paternal Grandmother    • Heart disease Maternal Grandmother    • Lung cancer Maternal Grandfather    • Alcohol abuse Maternal Uncle    • Drug abuse Maternal Uncle    • Breast cancer Neg Hx    • Colon cancer Neg Hx          SOCIAL HISTORY  Social History     Socioeconomic History   • Marital status:    • Number of children: 1   Tobacco Use   • Smoking status: Never   • Smokeless tobacco: Never   Vaping Use   • Vaping Use: Never used    Substance and Sexual Activity   • Alcohol use: Yes     Comment: social   • Drug use: Yes     Types: Marijuana     Comment: occ   • Sexual activity: Yes     Partners: Male     Birth control/protection: I.U.D.     Comment: Mirena         ALLERGIES  Patient has no known allergies.        REVIEW OF SYSTEMS  Review of Systems     All systems reviewed and negative except for those discussed in HPI.       PHYSICAL EXAM    I have reviewed the triage vital signs and nursing notes.    ED Triage Vitals   Temp Heart Rate Resp BP SpO2   11/18/22 1424 11/18/22 1424 11/18/22 1424 11/18/22 1426 11/18/22 1424   98.5 °F (36.9 °C) (!) 134 18 (!) 156/105 97 %      Temp src Heart Rate Source Patient Position BP Location FiO2 (%)   11/18/22 1424 11/18/22 1424 11/18/22 1525 11/18/22 1525 --   Tympanic Monitor Lying Right arm        Physical Exam    Physical Exam   Constitutional: No distress nor overtly toxic though she is quite uncomfortable appearing.   HENT:  Head: Normocephalic and atraumatic.   Oropharynx: Mucous membranes are moist.   Eyes: No scleral icterus. No conjunctival pallor.  Neck: Painless range of motion noted. Neck supple.   Cardiovascular: Normal rate, regular rhythm and intact distal pulses.  Pulmonary/Chest: No respiratory distress.  No tachypnea or increased work of breathing   abdominal: Soft. There is moderate suprapubic tenderness.   Pelvic Exam: Chaperoned by nurse- Shandra  External exam-swelling of the labia bilaterally with lesions consistent with herpes.  no obvious external signs of vaginal bleeding; speculum and bimanual exam deferred secondary to pain.  Musculoskeletal: Moves all extremities equally. There is no pedal edema or calf tenderness.   Neurological: Alert.  Baseline strength and sensation noted.   Skin: Skin is pink, warm, and dry. No pallor.   Psychiatric: Mood and affect normal.   Nursing note and vitals reviewed.    LAB RESULTS  No results found for this or any previous visit (from the past 24  hour(s)).    Ordered the above labs and independently reviewed the results.        RADIOLOGY  No Radiology Exams Resulted Within Past 24 Hours    I ordered the above noted radiological studies. Reviewed by me and discussed with radiologist.  See dictation for official radiology interpretation.      PROCEDURES    Procedures        MEDICATIONS GIVEN IN ER    Medications   lidocaine (LMX) 4 % cream 1 application (1 application Topical Given 11/18/22 1807)   ketorolac (TORADOL) injection 15 mg (has no administration in time range)   HYDROcodone-acetaminophen (NORCO) 5-325 MG per tablet 1 tablet (1 tablet Oral Given 11/18/22 1651)   acyclovir (ZOVIRAX) tablet 200 mg (200 mg Oral Given 11/18/22 1723)         PROGRESS, DATA ANALYSIS, CONSULTS, AND MEDICAL DECISION MAKING        All labs have been independently reviewed by me.  All radiology studies have been reviewed by me and discussed with radiologist dictating the report.   EKG's independently viewed and interpreted by me.  Discussion below represents my analysis of pertinent findings related to patient's condition, differential diagnosis, treatment plan and final disposition.      ED Course as of 11/18/22 1928 Fri Nov 18, 2022 1650 Plan topical and oral analgesics with oral antivirals then attempt urination.  Check postvoid residual. [RS]   1925 Just over 200 mL of urine in the bladder.  No indication for catheterization at this time.  Patient looks more comfortable after application of topical pain medication.  We will hold off on placing catheter at this time and recommend the patient go home and try these therapies this evening as well as urinating in the bathtub with some lukewarm water.  Patient agreeable to this plan.  She does ask for shot of something for pain and thus will receive some Toradol before she departs. [RS]      ED Course User Index  [RS] Julito Linares MD       AS OF 19:28 EST VITALS:    BP - 148/99  HR - 78  TEMP - 98.5 °F (36.9 °C)  (Tympanic)  O2 SATS - 97%        DIAGNOSIS  Final diagnoses:   Herpes simplex vulvovaginitis         DISPOSITION  DISCHARGE    Patient discharged in stable condition.    Reviewed implications of results, diagnosis, meds, responsibility to follow up, warning signs and symptoms of possible worsening, potential complications and reasons to return to ER.    Patient/Family voiced understanding of above instructions.    Discussed plan for discharge, as there is no emergent indication for admission. Patient referred to primary care provider for regular health maintenance. Pt/family is agreeable and understands need for follow up and possible repeat testing.  Pt is aware that discharge does not mean that nothing is wrong but it indicates no emergency is present that requires admission and they must continue care with follow-up as given below or physician of their choice.     FOLLOW-UP  Reymundo Hernandez MD  [address]  [facility] [id number] [phone]    Schedule an appointment as soon as possible for a visit   As needed    Shane Long MD  [address]  [facility] [address]  [phone]    Schedule an appointment as soon as possible for a visit in 3 days  If symptoms fail to improve         Medication List      New Prescriptions    acyclovir 400 MG tablet  Commonly known as: ZOVIRAX  Take 1 tablet by mouth 5 (Five) Times a Day. Take no more than 5 doses a day.           Where to Get Your Medications      You can get these medications from any pharmacy    Bring a paper prescription for each of these medications  · acyclovir 400 MG tablet  · lidocaine 3 % cream cream            Julito Linares MD  11/18/22 [id number]

## 2022-11-19 LAB
HSV1 DNA SPEC QL NAA+PROBE: POSITIVE
HSV2 DNA SPEC QL NAA+PROBE: NEGATIVE

## 2022-11-22 ENCOUNTER — TELEPHONE (OUTPATIENT)
Dept: OBSTETRICS AND GYNECOLOGY | Facility: CLINIC | Age: 32
End: 2022-11-22

## 2022-11-22 NOTE — TELEPHONE ENCOUNTER
----- Message from Melinda Ramsay RN sent at 11/22/2022  8:32 AM EST -----  Regarding: Question regarding HSV 1 AND 2-SPECIFIC AB, IGG  Contact: [phone]  My Chart. Appt 11/16/22 stating that her pain has lessened. I gave her a doctor's note for last week, but she is requesting an extra day, yesterday, and for it to have a letterhead of [facility]  vs. [facility] OB[facility]GYN office. States she has n  ot told anyone and doesn't want anyone to know. I am unaware of any other letterhead. Thank you.      ----- Message -----  From: Melva MANCERA  Sent: 11/21/2022   5:52 PM EST  To: [facility]  Subject: Question regarding HSV 1 AND 2-SPECIFIC AB, #    Still having pain but it has lessened. Can you add an extra day to my doctors not and make it to where it just says [facility] vs a Gynecologist office. I haven’t told anyone at work and don’t want anyone to know.

## 2022-11-28 ENCOUNTER — PATIENT MESSAGE (OUTPATIENT)
Dept: OBSTETRICS AND GYNECOLOGY | Facility: CLINIC | Age: 32
End: 2022-11-28

## 2022-11-28 RX ORDER — ACYCLOVIR 400 MG/1
400 TABLET ORAL
Qty: 35 TABLET | Refills: 0 | Status: SHIPPED | OUTPATIENT
Start: 2022-11-28 | End: 2022-11-28 | Stop reason: SDUPTHER

## 2022-11-28 RX ORDER — ACYCLOVIR 400 MG/1
400 TABLET ORAL
Qty: 35 TABLET | Refills: 0 | Status: SHIPPED | OUTPATIENT
Start: 2022-11-28 | End: 2022-12-09 | Stop reason: SDUPTHER

## 2022-11-30 DIAGNOSIS — N76.0 VULVOVAGINITIS: ICD-10-CM

## 2022-11-30 RX ORDER — ACYCLOVIR 50 MG/G
OINTMENT TOPICAL
Qty: 15 G | Refills: 0 | Status: SHIPPED | OUTPATIENT
Start: 2022-11-30 | End: 2023-02-06 | Stop reason: SDUPTHER

## 2022-12-09 ENCOUNTER — PATIENT MESSAGE (OUTPATIENT)
Dept: OBSTETRICS AND GYNECOLOGY | Facility: CLINIC | Age: 32
End: 2022-12-09

## 2022-12-09 RX ORDER — ACYCLOVIR 400 MG/1
400 TABLET ORAL
Qty: 35 TABLET | Refills: 2 | Status: SHIPPED | OUTPATIENT
Start: 2022-12-09 | End: 2023-02-06 | Stop reason: SDUPTHER

## 2022-12-25 ENCOUNTER — HOSPITAL ENCOUNTER (EMERGENCY)
Facility: HOSPITAL | Age: 32
Discharge: HOME | End: 2022-12-25
Attending: EMERGENCY MEDICINE | Admitting: EMERGENCY MEDICINE

## 2022-12-25 ENCOUNTER — APPOINTMENT (OUTPATIENT)
Dept: GENERAL RADIOLOGY | Facility: HOSPITAL | Age: 32
End: 2022-12-25

## 2022-12-25 VITALS
RESPIRATION RATE: 18 BRPM | HEART RATE: 89 BPM | OXYGEN SATURATION: 94 % | HEIGHT: 67 IN | WEIGHT: 258 LBS | DIASTOLIC BLOOD PRESSURE: 93 MMHG | BODY MASS INDEX: 40.49 KG/M2 | SYSTOLIC BLOOD PRESSURE: 132 MMHG | TEMPERATURE: 97.6 F

## 2022-12-25 DIAGNOSIS — Z87.09 HISTORY OF ASTHMA: ICD-10-CM

## 2022-12-25 DIAGNOSIS — J10.1 INFLUENZA A: Primary | ICD-10-CM

## 2022-12-25 DIAGNOSIS — J40 BRONCHITIS: ICD-10-CM

## 2022-12-25 DIAGNOSIS — I10 HYPERTENSION, UNSPECIFIED TYPE: ICD-10-CM

## 2022-12-25 LAB
ALBUMIN SERPL-MCNC: 3.7 G/DL (ref 3.5–5.2)
ALBUMIN/GLOB SERPL: 1 G/DL
ALP SERPL-CCNC: 81 U/L (ref 39–117)
ALT SERPL W P-5'-P-CCNC: 46 U/L (ref 1–33)
ANION GAP SERPL CALCULATED.3IONS-SCNC: 10.1 MMOL/L (ref 5–15)
AST SERPL-CCNC: 27 U/L (ref 1–32)
B PARAPERT DNA SPEC QL NAA+PROBE: NOT DETECTED
B PERT DNA SPEC QL NAA+PROBE: NOT DETECTED
BASOPHILS # BLD AUTO: 0.04 10*3/MM3 (ref 0–0.2)
BASOPHILS NFR BLD AUTO: 0.4 % (ref 0–1.5)
BILIRUB SERPL-MCNC: <0.2 MG/DL (ref 0–1.2)
BUN SERPL-MCNC: 16 MG/DL (ref 6–20)
BUN/CREAT SERPL: 19 (ref 7–25)
C PNEUM DNA NPH QL NAA+NON-PROBE: NOT DETECTED
CALCIUM SPEC-SCNC: 9.4 MG/DL (ref 8.6–10.5)
CHLORIDE SERPL-SCNC: 106 MMOL/L (ref 98–107)
CO2 SERPL-SCNC: 28.9 MMOL/L (ref 22–29)
CREAT SERPL-MCNC: 0.84 MG/DL (ref 0.57–1)
D DIMER PPP FEU-MCNC: 0.38 MCGFEU/ML (ref 0–0.5)
D-LACTATE SERPL-SCNC: 2.2 MMOL/L (ref 0.5–2)
DEPRECATED RDW RBC AUTO: 44.6 FL (ref 37–54)
EGFRCR SERPLBLD CKD-EPI 2021: 94.8 ML/MIN/1.73
EOSINOPHIL # BLD AUTO: 0.23 10*3/MM3 (ref 0–0.4)
EOSINOPHIL NFR BLD AUTO: 2.3 % (ref 0.3–6.2)
ERYTHROCYTE [DISTWIDTH] IN BLOOD BY AUTOMATED COUNT: 14.4 % (ref 12.3–15.4)
FLUAV SUBTYP SPEC NAA+PROBE: ABNORMAL
FLUBV RNA ISLT QL NAA+PROBE: NOT DETECTED
GLOBULIN UR ELPH-MCNC: 3.7 GM/DL
GLUCOSE SERPL-MCNC: 99 MG/DL (ref 65–99)
HADV DNA SPEC NAA+PROBE: NOT DETECTED
HCG SERPL QL: NEGATIVE
HCOV 229E RNA SPEC QL NAA+PROBE: NOT DETECTED
HCOV HKU1 RNA SPEC QL NAA+PROBE: NOT DETECTED
HCOV NL63 RNA SPEC QL NAA+PROBE: NOT DETECTED
HCOV OC43 RNA SPEC QL NAA+PROBE: NOT DETECTED
HCT VFR BLD AUTO: 40.4 % (ref 34–46.6)
HGB BLD-MCNC: 12.9 G/DL (ref 12–15.9)
HMPV RNA NPH QL NAA+NON-PROBE: NOT DETECTED
HOLD SPECIMEN: NORMAL
HPIV1 RNA ISLT QL NAA+PROBE: NOT DETECTED
HPIV2 RNA SPEC QL NAA+PROBE: NOT DETECTED
HPIV3 RNA NPH QL NAA+PROBE: NOT DETECTED
HPIV4 P GENE NPH QL NAA+PROBE: NOT DETECTED
IMM GRANULOCYTES # BLD AUTO: 0.11 10*3/MM3 (ref 0–0.05)
IMM GRANULOCYTES NFR BLD AUTO: 1.1 % (ref 0–0.5)
LYMPHOCYTES # BLD AUTO: 2.61 10*3/MM3 (ref 0.7–3.1)
LYMPHOCYTES NFR BLD AUTO: 26.6 % (ref 19.6–45.3)
M PNEUMO IGG SER IA-ACNC: NOT DETECTED
MCH RBC QN AUTO: 27.3 PG (ref 26.6–33)
MCHC RBC AUTO-ENTMCNC: 31.9 G/DL (ref 31.5–35.7)
MCV RBC AUTO: 85.6 FL (ref 79–97)
MONOCYTES # BLD AUTO: 0.77 10*3/MM3 (ref 0.1–0.9)
MONOCYTES NFR BLD AUTO: 7.8 % (ref 5–12)
NEUTROPHILS NFR BLD AUTO: 6.05 10*3/MM3 (ref 1.7–7)
NEUTROPHILS NFR BLD AUTO: 61.8 % (ref 42.7–76)
NRBC BLD AUTO-RTO: 0 /100 WBC (ref 0–0.2)
NT-PROBNP SERPL-MCNC: 66.3 PG/ML (ref 0–450)
PLATELET # BLD AUTO: 543 10*3/MM3 (ref 140–450)
PMV BLD AUTO: 9.1 FL (ref 6–12)
POTASSIUM SERPL-SCNC: 4.3 MMOL/L (ref 3.5–5.2)
PROCALCITONIN SERPL-MCNC: 0.05 NG/ML (ref 0–0.25)
PROT SERPL-MCNC: 7.4 G/DL (ref 6–8.5)
QT INTERVAL: 391 MS
RBC # BLD AUTO: 4.72 10*6/MM3 (ref 3.77–5.28)
RHINOVIRUS RNA SPEC NAA+PROBE: NOT DETECTED
RSV RNA NPH QL NAA+NON-PROBE: NOT DETECTED
SARS-COV-2 RNA NPH QL NAA+NON-PROBE: NOT DETECTED
SODIUM SERPL-SCNC: 145 MMOL/L (ref 136–145)
TROPONIN T SERPL-MCNC: <0.01 NG/ML (ref 0–0.03)
WBC NRBC COR # BLD: 9.81 10*3/MM3 (ref 3.4–10.8)

## 2022-12-25 PROCEDURE — 84484 ASSAY OF TROPONIN QUANT: CPT | Performed by: PHYSICIAN ASSISTANT

## 2022-12-25 PROCEDURE — C9803 HOPD COVID-19 SPEC COLLECT: HCPCS | Performed by: PHYSICIAN ASSISTANT

## 2022-12-25 PROCEDURE — 80053 COMPREHEN METABOLIC PANEL: CPT | Performed by: PHYSICIAN ASSISTANT

## 2022-12-25 PROCEDURE — 94640 AIRWAY INHALATION TREATMENT: CPT

## 2022-12-25 PROCEDURE — 0202U NFCT DS 22 TRGT SARS-COV-2: CPT | Performed by: PHYSICIAN ASSISTANT

## 2022-12-25 PROCEDURE — 83605 ASSAY OF LACTIC ACID: CPT | Performed by: PHYSICIAN ASSISTANT

## 2022-12-25 PROCEDURE — 85025 COMPLETE CBC W/AUTO DIFF WBC: CPT | Performed by: PHYSICIAN ASSISTANT

## 2022-12-25 PROCEDURE — 93010 ELECTROCARDIOGRAM REPORT: CPT | Performed by: INTERNAL MEDICINE

## 2022-12-25 PROCEDURE — 83880 ASSAY OF NATRIURETIC PEPTIDE: CPT | Performed by: PHYSICIAN ASSISTANT

## 2022-12-25 PROCEDURE — 99284 EMERGENCY DEPT VISIT MOD MDM: CPT

## 2022-12-25 PROCEDURE — 85379 FIBRIN DEGRADATION QUANT: CPT | Performed by: PHYSICIAN ASSISTANT

## 2022-12-25 PROCEDURE — 71045 X-RAY EXAM CHEST 1 VIEW: CPT

## 2022-12-25 PROCEDURE — 93005 ELECTROCARDIOGRAM TRACING: CPT | Performed by: PHYSICIAN ASSISTANT

## 2022-12-25 PROCEDURE — 36415 COLL VENOUS BLD VENIPUNCTURE: CPT

## 2022-12-25 PROCEDURE — 94799 UNLISTED PULMONARY SVC/PX: CPT

## 2022-12-25 PROCEDURE — 84703 CHORIONIC GONADOTROPIN ASSAY: CPT | Performed by: PHYSICIAN ASSISTANT

## 2022-12-25 PROCEDURE — 87040 BLOOD CULTURE FOR BACTERIA: CPT | Performed by: PHYSICIAN ASSISTANT

## 2022-12-25 PROCEDURE — 84145 PROCALCITONIN (PCT): CPT | Performed by: PHYSICIAN ASSISTANT

## 2022-12-25 RX ORDER — DEXTROMETHORPHAN HYDROBROMIDE AND PROMETHAZINE HYDROCHLORIDE 15; 6.25 MG/5ML; MG/5ML
5 SYRUP ORAL 4 TIMES DAILY PRN
Qty: 240 ML | Refills: 0 | Status: SHIPPED | OUTPATIENT
Start: 2022-12-25 | End: 2022-12-25 | Stop reason: SDUPTHER

## 2022-12-25 RX ORDER — IPRATROPIUM BROMIDE AND ALBUTEROL SULFATE 2.5; .5 MG/3ML; MG/3ML
3 SOLUTION RESPIRATORY (INHALATION) ONCE
Status: COMPLETED | OUTPATIENT
Start: 2022-12-25 | End: 2022-12-25

## 2022-12-25 RX ORDER — METHYLPREDNISOLONE 4 MG/1
TABLET ORAL
Qty: 1 EACH | Refills: 0 | Status: SHIPPED | OUTPATIENT
Start: 2022-12-25

## 2022-12-25 RX ORDER — DEXTROMETHORPHAN HYDROBROMIDE AND PROMETHAZINE HYDROCHLORIDE 15; 6.25 MG/5ML; MG/5ML
5 SYRUP ORAL 4 TIMES DAILY PRN
Qty: 240 ML | Refills: 0 | Status: SHIPPED | OUTPATIENT
Start: 2022-12-25

## 2022-12-25 RX ORDER — METHYLPREDNISOLONE 4 MG/1
TABLET ORAL
Qty: 1 EACH | Refills: 0 | Status: SHIPPED | OUTPATIENT
Start: 2022-12-25 | End: 2022-12-25 | Stop reason: SDUPTHER

## 2022-12-25 RX ORDER — BENZONATATE 100 MG/1
200 CAPSULE ORAL ONCE
Status: COMPLETED | OUTPATIENT
Start: 2022-12-25 | End: 2022-12-25

## 2022-12-25 RX ORDER — SODIUM CHLORIDE 0.9 % (FLUSH) 0.9 %
10 SYRINGE (ML) INJECTION AS NEEDED
Status: DISCONTINUED | OUTPATIENT
Start: 2022-12-25 | End: 2022-12-25 | Stop reason: HOSPADM

## 2022-12-25 RX ADMIN — SODIUM CHLORIDE, POTASSIUM CHLORIDE, SODIUM LACTATE AND CALCIUM CHLORIDE 1000 ML: 600; 310; 30; 20 INJECTION, SOLUTION INTRAVENOUS at 05:47

## 2022-12-25 RX ADMIN — IPRATROPIUM BROMIDE AND ALBUTEROL SULFATE 3 ML: .5; 3 SOLUTION RESPIRATORY (INHALATION) at 04:26

## 2022-12-25 RX ADMIN — BENZONATATE 200 MG: 100 CAPSULE ORAL at 04:14

## 2022-12-25 NOTE — ED PROVIDER NOTES
EMERGENCY DEPARTMENT ENCOUNTER    Room Number:  06/06  Date of encounter:  12/25/2022  PCP: Reymundo Hernandez MD  Historian: Patient      HPI:  Chief Complaint: Upper respiratory infection  A complete HPI/ROS/PMH/PSH/SH/FH are unobtainable due to: N/A    Context: Melva MANCERA is a 32 y.o. female with past medical history of hypertension and asthma who presents to the ED c/o cough.  Patient states that she has been sick for about 3 weeks with upper respiratory infection symptoms and has been to her doctor 3 times.  She was initially diagnosed with a URI, on her second visit he tested positive for influenza A, and on the third visit they thought that she perhaps had developed pneumonia.  Patient has had cough, congestion, runny nose, hoarseness, fevers and chills.  Patient has not had any sore throat, chest pain, or UTI symptoms.  She has been on a round of Tamiflu and was given an IM injection of steroids.  Patient states that she continues to cough and is feeling worse.      PAST MEDICAL HISTORY  Active Ambulatory Problems     Diagnosis Date Noted   • Vitamin D deficiency 05/14/2014   • Benign essential hypertension 01/07/2019   • Obesity, Class III, BMI 40-49.9 (morbid obesity) ([facility]) 03/29/2022   • Dietary counseling 03/29/2022   • Pre-op evaluation 03/29/2022   • Depression with anxiety 03/29/2022   • ADHD 03/29/2022   • Asthma 03/29/2022   • Edema 03/29/2022   • Snoring 03/29/2022   • Multiple joint pain 03/29/2022   • GERD (gastroesophageal reflux disease) 03/29/2022     Resolved Ambulatory Problems     Diagnosis Date Noted   • Asthma affecting pregnancy, antepartum 10/19/2016   • Encounter for supervision of normal first pregnancy in third trimester 10/19/2016   • Screening for HIV (human immunodeficiency virus) 10/19/2016   • Screen for STD (sexually transmitted disease) 10/19/2016   • Screening for diabetes mellitus 02/16/2017   • Need for Tdap vaccination 04/05/2017   • Mild pre-eclampsia in third  trimester 05/17/2017   • Pregnancy 06/05/2017   • Group B Streptococcus carrier, +RV culture, currently pregnant 06/11/2017   • Pre-eclampsia, mild, antepartum 06/12/2017   • Normal delivery at term 06/12/2017   • Postpartum exam 07/26/2017   • Birth control counseling 07/26/2017   • Encounter for IUD insertion 09/13/2017   • IUD check up 10/12/2017   • Encounter for gynecological examination 06/14/2018   • IUD threads lost 06/14/2018     Past Medical History:   Diagnosis Date   • Anxiety    • Bronchitis    • Chlamydia    • Chronic sinus infection    • COVID-19 01/2022   • Depression    • Eczema    • Gestational hypertension    • Urinary tract infection    • Urogenital trichomoniasis          PAST SURGICAL HISTORY  Past Surgical History:   Procedure Laterality Date   • WISDOM TOOTH EXTRACTION           FAMILY HISTORY  Family History   Problem Relation Age of Onset   • Heart attack Father    • Diabetes Father    • Cancer Mother         BLOOD   • Bipolar disorder Mother    • Hypertension Mother    • Anxiety disorder Sister    • Diabetes Paternal Grandmother    • Heart disease Maternal Grandmother    • Lung cancer Maternal Grandfather    • Alcohol abuse Maternal Uncle    • Drug abuse Maternal Uncle    • Breast cancer Neg Hx    • Colon cancer Neg Hx          SOCIAL HISTORY  Social History     Socioeconomic History   • Marital status:    • Number of children: 1   Tobacco Use   • Smoking status: Never   • Smokeless tobacco: Never   Vaping Use   • Vaping Use: Never used   Substance and Sexual Activity   • Alcohol use: Yes     Comment: social   • Drug use: Yes     Types: Marijuana     Comment: occ   • Sexual activity: Yes     Partners: Male     Birth control/protection: I.U.D.     Comment: Mirena         ALLERGIES  Patient has no known allergies.        REVIEW OF SYSTEMS  Review of Systems     All systems reviewed and negative except for those discussed in HPI.       PHYSICAL EXAM    I have reviewed the triage vital  signs and nursing notes.    ED Triage Vitals   Temp Heart Rate Resp BP SpO2   12/25/22 0254 12/25/22 0254 12/25/22 0254 12/25/22 0254 12/25/22 0254   96.2 °F (35.7 °C) 113 22 139/97 95 %      Temp src Heart Rate Source Patient Position BP Location FiO2 (%)   12/25/22 0413 12/25/22 0426 12/25/22 0254 12/25/22 0254 --   Oral Monitor Standing Left arm        Physical Exam  GENERAL: Alert and oriented x4, not distressed  HENT: TMs clear, no pharyngeal erythema or tonsillar exudate, no meningeal signs or nuchal rigidity, no cervical lymphadenopathy  EYES: no scleral icterus, PERRL, EOMI  CV: regular rhythm, regular rate  RESPIRATORY: normal effort, a few expiry wheezes bilaterally  ABDOMEN: soft/nontender  MUSCULOSKELETAL: no deformity  NEURO: alert, moves all extremities, follows commands  SKIN: warm, dry, no rashes      LAB RESULTS  Recent Results (from the past 24 hour(s))   ECG 12 Lead Dyspnea    Collection Time: 12/25/22  3:59 AM   Result Value Ref Range    QT Interval 391 ms   Comprehensive Metabolic Panel    Collection Time: 12/25/22  4:05 AM    Specimen: Blood   Result Value Ref Range    Glucose 99 65 - 99 mg/dL    BUN 16 6 - 20 mg/dL    Creatinine 0.84 0.57 - 1.00 mg/dL    Sodium 145 136 - 145 mmol/L    Potassium 4.3 3.5 - 5.2 mmol/L    Chloride 106 98 - 107 mmol/L    CO2 28.9 22.0 - 29.0 mmol/L    Calcium 9.4 8.6 - 10.5 mg/dL    Total Protein 7.4 6.0 - 8.5 g/dL    Albumin 3.70 3.50 - 5.20 g/dL    ALT (SGPT) 46 (H) 1 - 33 U/L    AST (SGOT) 27 1 - 32 U/L    Alkaline Phosphatase 81 39 - 117 U/L    Total Bilirubin <0.2 0.0 - 1.2 mg/dL    Globulin 3.7 gm/dL    A/G Ratio 1.0 g/dL    BUN/Creatinine Ratio 19.0 7.0 - 25.0    Anion Gap 10.1 5.0 - 15.0 mmol/L    eGFR 94.8 >60.0 mL/min/1.73   BNP    Collection Time: 12/25/22  4:05 AM    Specimen: Blood   Result Value Ref Range    proBNP 66.3 0.0 - 450.0 pg/mL   Troponin    Collection Time: 12/25/22  4:05 AM    Specimen: Blood   Result Value Ref Range    Troponin T <0.010  0.000 - 0.030 ng/mL   Lactic Acid, Plasma    Collection Time: 12/25/22  4:05 AM    Specimen: Blood   Result Value Ref Range    Lactate 2.2 (C) 0.5 - 2.0 mmol/L   Procalcitonin    Collection Time: 12/25/22  4:05 AM    Specimen: Blood   Result Value Ref Range    Procalcitonin 0.05 0.00 - 0.25 ng/mL   D-dimer, Quantitative    Collection Time: 12/25/22  4:05 AM    Specimen: Blood   Result Value Ref Range    D-Dimer, Quantitative 0.38 0.00 - 0.50 MCGFEU/mL   CBC Auto Differential    Collection Time: 12/25/22  4:05 AM    Specimen: Blood   Result Value Ref Range    WBC 9.81 3.40 - 10.80 10*3/mm3    RBC 4.72 3.77 - 5.28 10*6/mm3    Hemoglobin 12.9 12.0 - 15.9 g/dL    Hematocrit 40.4 34.0 - 46.6 %    MCV 85.6 79.0 - 97.0 fL    MCH 27.3 26.6 - 33.0 pg    MCHC 31.9 31.5 - 35.7 g/dL    RDW 14.4 12.3 - 15.4 %    RDW-SD 44.6 37.0 - 54.0 fl    MPV 9.1 6.0 - 12.0 fL    Platelets 543 (H) 140 - 450 10*3/mm3    Neutrophil % 61.8 42.7 - 76.0 %    Lymphocyte % 26.6 19.6 - 45.3 %    Monocyte % 7.8 5.0 - 12.0 %    Eosinophil % 2.3 0.3 - 6.2 %    Basophil % 0.4 0.0 - 1.5 %    Immature Grans % 1.1 (H) 0.0 - 0.5 %    Neutrophils, Absolute 6.05 1.70 - 7.00 10*3/mm3    Lymphocytes, Absolute 2.61 0.70 - 3.10 10*3/mm3    Monocytes, Absolute 0.77 0.10 - 0.90 10*3/mm3    Eosinophils, Absolute 0.23 0.00 - 0.40 10*3/mm3    Basophils, Absolute 0.04 0.00 - 0.20 10*3/mm3    Immature Grans, Absolute 0.11 (H) 0.00 - 0.05 10*3/mm3    nRBC 0.0 0.0 - 0.2 /100 WBC   Gold Top - SST    Collection Time: 12/25/22  4:05 AM   Result Value Ref Range    Extra Tube Hold for add-ons.    Respiratory Panel PCR w/COVID-19(SARS-CoV-2) LEONARD/SADIA/ISAURA/PAD/COR/MAD/BONILLA In-House, NP Swab in UTM/VTM, 3-4 HR TAT - Swab, Nasopharynx    Collection Time: 12/25/22  4:07 AM    Specimen: Nasopharynx; Swab   Result Value Ref Range    ADENOVIRUS, PCR Not Detected Not Detected    Coronavirus 229E Not Detected Not Detected    Coronavirus HKU1 Not Detected Not Detected    Coronavirus NL63 Not  Detected Not Detected    Coronavirus OC43 Not Detected Not Detected    COVID19 Not Detected Not Detected - Ref. Range    Human Metapneumovirus Not Detected Not Detected    Human Rhinovirus/Enterovirus Not Detected Not Detected    Influenza A PCR Equivocal (A) Not Detected    Influenza B PCR Not Detected Not Detected    Parainfluenza Virus 1 Not Detected Not Detected    Parainfluenza Virus 2 Not Detected Not Detected    Parainfluenza Virus 3 Not Detected Not Detected    Parainfluenza Virus 4 Not Detected Not Detected    RSV, PCR Not Detected Not Detected    Bordetella pertussis pcr Not Detected Not Detected    Bordetella parapertussis PCR Not Detected Not Detected    Chlamydophila pneumoniae PCR Not Detected Not Detected    Mycoplasma pneumo by PCR Not Detected Not Detected   hCG, Serum, Qualitative    Collection Time: 12/25/22  5:00 AM    Specimen: Blood   Result Value Ref Range    HCG Qualitative Negative Negative       Ordered the above labs and independently reviewed the results.        RADIOLOGY  XR Chest 1 View    Result Date: 12/25/2022  SINGLE VIEW OF THE CHEST  HISTORY: Shortness of air cough  COMPARISON: 03/30/2022  FINDINGS: Cardiomegaly is present. There does appear to be some vascular congestion. Lung volumes are diminished. There is mild elevation of the right hemidiaphragm. No pneumothorax or pleural effusion is seen.      Cardiomegaly with vascular congestion.  This report was finalized on 12/25/2022 4:06 AM by Dr. Angie Lira M.D.        I ordered the above noted radiological studies. Reviewed by me and discussed with radiologist.  See dictation for official radiology interpretation.      PROCEDURES    Procedures      MEDICATIONS GIVEN IN ER    Medications   sodium chloride 0.9 % flush 10 mL (has no administration in time range)   ipratropium-albuterol (DUO-NEB) nebulizer solution 3 mL (3 mL Nebulization Given 12/25/22 0426)   benzonatate (TESSALON) capsule 200 mg (200 mg Oral Given 12/25/22  0414)   lactated ringers bolus 1,000 mL (1,000 mL Intravenous New Bag 12/25/22 0547)         PROGRESS, DATA ANALYSIS, CONSULTS, AND MEDICAL DECISION MAKING    All labs have been independently reviewed by me.  All radiology studies have been reviewed by me and discussed with radiologist dictating the report.   EKG's independently viewed and interpreted by me.  Discussion below represents my analysis of pertinent findings related to patient's condition, differential diagnosis, treatment plan and final disposition.    DDx: Includes but not limited to influenza, COVID, pneumonia, URI, bronchitis, pulmonary embolus, ACS, CHF, reactive airway disease, asthma    ED Course as of 12/25/22 0639   Sun Dec 25, 2022   0423 EKG          EKG time: 3:59 AM  Rhythm/Rate: Sinus rhythm at 79 bpm, artifact present  P waves and DC: Normal  QRS, axis: LVH  ST and T waves: No acute ST/T wave changes    Interpreted Contemporaneously by me, independently viewed  Not significantly changed compared to prior EKG of 3/30/2022.   [ARIANNE]   0430 WBC: 9.81 [ARIANNE]   0430 Hemoglobin: 12.9 [ARIANNE]   0430 Hematocrit: 40.4 [ARIANNE]   0430 Platelets(!): 543 [ARIANNE]   0516 Glucose: 99 [ARIANNE]   0519 BUN: 16 [ARIANNE]   0519 Creatinine: 0.84 [ARIANNE]   0519 Sodium: 145 [ARIANNE]   0519 Potassium: 4.3 [ARIANNE]   0519 Chloride: 106 [ARIANNE]   0519 CO2: 28.9 [ARIANNE]   0519 Lactate(!!): 2.2 [ARIANNE]   0519 Procalcitonin: 0.05 [ARIANNE]   0519 D-Dimer, Quant: 0.38 [ARIANNE]   0519 proBNP: 66.3 [ARIANNE]   0520 Troponin T: <0.010 [ARIANNE]   0606 HCG Qualitative: Negative [ARIANNE]   0611 Influenza A PCR(!): Equivocal [ARIANNE]   0619 Patient rechecked, sitting in bed, feeling slightly improved after IV fluids and cough medicine.  Discussed results including minimally elevated lactic acid and equivocal influenza A swab, as well as negative cardiac work-up, negative D-dimer, and chest x-ray.  Discussed plan for discharge and treatment of bronchitis due to influenza A.  Patient expressed understanding and agrees with plan. [ARIANNE]      ED  Course User Index  [ARIANNE] Abdon Sterling PA       MDM: Patient's cardiac evaluation is normal including a negative troponin, BNP, nonischemic EKG, and a negative D-dimer therefore ruling out ACS, CHF, or pulmonary emboli.  Patient's chest x-ray shows no evidence of pneumonia and her respiratory viral panel is positive for influenza.  Patient did have a minimally elevated lactic acid and she has been given IV fluids but there is no evidence for bacterial infection and will continue to treat for influenza.  Patient's vital signs are stable and she is safe for discharge home.    PPE: The patient wore a surgical mask throughout the entire patient encounter. I wore an N95.    AS OF 06:39 EST VITALS:    BP - 131/96  HR - 104  TEMP - 98.7 °F (37.1 °C) (Oral)  O2 SATS - 96%        DIAGNOSIS  Final diagnoses:   Influenza A   Bronchitis   Hypertension, unspecified type   History of asthma         DISPOSITION  DISCHARGE    Patient discharged in stable condition.    Reviewed implications of results, diagnosis, meds, responsibility to follow up, warning signs and symptoms of possible worsening, potential complications and reasons to return to ER.    Patient/Family voiced understanding of above instructions.    Discussed plan for discharge, as there is no emergent indication for admission. Patient referred to primary care provider for BP management due to today's BP. Pt/family is agreeable and understands need for follow up and repeat testing.  Pt is aware that discharge does not mean that nothing is wrong but it indicates no emergency is present that requires admission and they must continue care with follow-up as given below or physician of their choice.     FOLLOW-UP  Reymundo Hernandez MD  [address] [id number] [phone]    Schedule an appointment as soon as possible for a visit in 1 week           Medication List      New Prescriptions    methylPREDNISolone 4 MG dose pack  Commonly known as:  MEDROL  Take as directed on package instructions.     promethazine-dextromethorphan 6.25-15 MG/5ML syrup  Commonly known as: PROMETHAZINE-DM  Take 5 mL by mouth 4 (Four) Times a Day As Needed for Cough.           Where to Get Your Medications      These medications were sent to [facility]/pharmacy #[id number] - [place], [place] - [address] AT [place] [facility] - [phone] [facility] [phone]   [address], [place] [id number]    Hours: 24-hours Phone: [phone]   · methylPREDNISolone 4 MG dose pack  · promethazine-dextromethorphan 6.25-15 MG/5ML syrup           Note Disclaimer: At [facility], we believe that sharing information builds trust and better relationships. You are receiving this note because you recently visited [facility]. It is possible you will see health information before a provider has talked with you about it. This kind of information can be easy to misunderstand. To help you fully understand what it means for your health, we urge you to discuss this note with your provider.       Abdon Sterling PA  12/25/22 0633       Abdon Sterling PA  12/25/22 0640

## 2022-12-25 NOTE — ED PROVIDER NOTES
MD ATTESTATION NOTE    The MAYO and I have discussed this patient's history, physical exam, and treatment plan.    I provided a substantive portion of the care of this patient. I personally performed the physical exam, in its entirety. The attached note describes my personal findings.      Melva MANCERA is a 32 y.o. female who presents to the ED c/o cough, sore throat, shortness of breath.  Throughout the past 2 weeks.  Patient has been diagnosed with influenza and told she has pneumonia.  Patient has finished a course of Tamiflu.  Patient also states she had a sore throat.      On exam:  GENERAL: Mild distressed  HENT: nares patent, very mild swelling to bilateral tonsils uvula is midline   EYES: no scleral icterus  CV: regular rhythm, regular rate  RESPIRATORY: normal effort, clear to station laterally  ABDOMEN: soft  MUSCULOSKELETAL: no deformity  NEURO: alert, moves all extremities, follows commands  SKIN: warm, dry    Labs  Recent Results (from the past 24 hour(s))   ECG 12 Lead Dyspnea    Collection Time: 12/25/22  3:59 AM   Result Value Ref Range    QT Interval 391 ms   Comprehensive Metabolic Panel    Collection Time: 12/25/22  4:05 AM    Specimen: Blood   Result Value Ref Range    Glucose 99 65 - 99 mg/dL    BUN 16 6 - 20 mg/dL    Creatinine 0.84 0.57 - 1.00 mg/dL    Sodium 145 136 - 145 mmol/L    Potassium 4.3 3.5 - 5.2 mmol/L    Chloride 106 98 - 107 mmol/L    CO2 28.9 22.0 - 29.0 mmol/L    Calcium 9.4 8.6 - 10.5 mg/dL    Total Protein 7.4 6.0 - 8.5 g/dL    Albumin 3.70 3.50 - 5.20 g/dL    ALT (SGPT) 46 (H) 1 - 33 U/L    AST (SGOT) 27 1 - 32 U/L    Alkaline Phosphatase 81 39 - 117 U/L    Total Bilirubin <0.2 0.0 - 1.2 mg/dL    Globulin 3.7 gm/dL    A/G Ratio 1.0 g/dL    BUN/Creatinine Ratio 19.0 7.0 - 25.0    Anion Gap 10.1 5.0 - 15.0 mmol/L    eGFR 94.8 >60.0 mL/min/1.73   BNP    Collection Time: 12/25/22  4:05 AM    Specimen: Blood   Result Value Ref Range    proBNP 66.3 0.0 - 450.0 pg/mL   Troponin     Collection Time: 12/25/22  4:05 AM    Specimen: Blood   Result Value Ref Range    Troponin T <0.010 0.000 - 0.030 ng/mL   Lactic Acid, Plasma    Collection Time: 12/25/22  4:05 AM    Specimen: Blood   Result Value Ref Range    Lactate 2.2 (C) 0.5 - 2.0 mmol/L   Procalcitonin    Collection Time: 12/25/22  4:05 AM    Specimen: Blood   Result Value Ref Range    Procalcitonin 0.05 0.00 - 0.25 ng/mL   D-dimer, Quantitative    Collection Time: 12/25/22  4:05 AM    Specimen: Blood   Result Value Ref Range    D-Dimer, Quantitative 0.38 0.00 - 0.50 MCGFEU/mL   CBC Auto Differential    Collection Time: 12/25/22  4:05 AM    Specimen: Blood   Result Value Ref Range    WBC 9.81 3.40 - 10.80 10*3/mm3    RBC 4.72 3.77 - 5.28 10*6/mm3    Hemoglobin 12.9 12.0 - 15.9 g/dL    Hematocrit 40.4 34.0 - 46.6 %    MCV 85.6 79.0 - 97.0 fL    MCH 27.3 26.6 - 33.0 pg    MCHC 31.9 31.5 - 35.7 g/dL    RDW 14.4 12.3 - 15.4 %    RDW-SD 44.6 37.0 - 54.0 fl    MPV 9.1 6.0 - 12.0 fL    Platelets 543 (H) 140 - 450 10*3/mm3    Neutrophil % 61.8 42.7 - 76.0 %    Lymphocyte % 26.6 19.6 - 45.3 %    Monocyte % 7.8 5.0 - 12.0 %    Eosinophil % 2.3 0.3 - 6.2 %    Basophil % 0.4 0.0 - 1.5 %    Immature Grans % 1.1 (H) 0.0 - 0.5 %    Neutrophils, Absolute 6.05 1.70 - 7.00 10*3/mm3    Lymphocytes, Absolute 2.61 0.70 - 3.10 10*3/mm3    Monocytes, Absolute 0.77 0.10 - 0.90 10*3/mm3    Eosinophils, Absolute 0.23 0.00 - 0.40 10*3/mm3    Basophils, Absolute 0.04 0.00 - 0.20 10*3/mm3    Immature Grans, Absolute 0.11 (H) 0.00 - 0.05 10*3/mm3    nRBC 0.0 0.0 - 0.2 /100 WBC   Gold Top - SST    Collection Time: 12/25/22  4:05 AM   Result Value Ref Range    Extra Tube Hold for add-ons.    Respiratory Panel PCR w/COVID-19(SARS-CoV-2) LEONARD/SADIA/ISAURA/PAD/COR/MAD/BONILLA In-House, NP Swab in UTM/VTM, 3-4 HR TAT - Swab, Nasopharynx    Collection Time: 12/25/22  4:07 AM    Specimen: Nasopharynx; Swab   Result Value Ref Range    ADENOVIRUS, PCR Not Detected Not Detected    Coronavirus  229E Not Detected Not Detected    Coronavirus HKU1 Not Detected Not Detected    Coronavirus NL63 Not Detected Not Detected    Coronavirus OC43 Not Detected Not Detected    COVID19 Not Detected Not Detected - Ref. Range    Human Metapneumovirus Not Detected Not Detected    Human Rhinovirus/Enterovirus Not Detected Not Detected    Influenza A PCR Equivocal (A) Not Detected    Influenza B PCR Not Detected Not Detected    Parainfluenza Virus 1 Not Detected Not Detected    Parainfluenza Virus 2 Not Detected Not Detected    Parainfluenza Virus 3 Not Detected Not Detected    Parainfluenza Virus 4 Not Detected Not Detected    RSV, PCR Not Detected Not Detected    Bordetella pertussis pcr Not Detected Not Detected    Bordetella parapertussis PCR Not Detected Not Detected    Chlamydophila pneumoniae PCR Not Detected Not Detected    Mycoplasma pneumo by PCR Not Detected Not Detected   hCG, Serum, Qualitative    Collection Time: 12/25/22  5:00 AM    Specimen: Blood   Result Value Ref Range    HCG Qualitative Negative Negative       Radiology  XR Chest 1 View    Result Date: 12/25/2022  SINGLE VIEW OF THE CHEST  HISTORY: Shortness of air cough  COMPARISON: 03/30/2022  FINDINGS: Cardiomegaly is present. There does appear to be some vascular congestion. Lung volumes are diminished. There is mild elevation of the right hemidiaphragm. No pneumothorax or pleural effusion is seen.      Cardiomegaly with vascular congestion.  This report was finalized on 12/25/2022 4:06 AM by Dr. Angie Lira M.D.        Medical Decision Making:  ED Course as of 12/25/22 0641   Sun Dec 25, 2022   0423 EKG          EKG time: 3:59 AM  Rhythm/Rate: Sinus rhythm at 79 bpm, artifact present  P waves and AK: Normal  QRS, axis: LVH  ST and T waves: No acute ST/T wave changes    Interpreted Contemporaneously by me, independently viewed  Not significantly changed compared to prior EKG of 3/30/2022.   [ARIANNE]   0430 WBC: 9.81 [ARIANNE]   0430 Hemoglobin: 12.9 [ARIANNE]    0430 Hematocrit: 40.4 [ARIANNE]   0430 Platelets(!): 543 [ARIANNE]   0516 Glucose: 99 [ARIANNE]   0519 BUN: 16 [ARIANNE]   0519 Creatinine: 0.84 [ARIANNE]   0519 Sodium: 145 [ARIANNE]   0519 Potassium: 4.3 [ARIANNE]   0519 Chloride: 106 [ARIANNE]   0519 CO2: 28.9 [ARIANNE]   0519 Lactate(!!): 2.2 [ARIANNE]   0519 Procalcitonin: 0.05 [ARIANNE]   0519 D-Dimer, Quant: 0.38 [ARIANNE]   0519 proBNP: 66.3 [ARIANNE]   0520 Troponin T: <0.010 [ARIANNE]   0606 HCG Qualitative: Negative [ARIANNE]   0611 Influenza A PCR(!): Equivocal [ARIANNE]   0619 Patient rechecked, sitting in bed, feeling slightly improved after IV fluids and cough medicine.  Discussed results including minimally elevated lactic acid and equivocal influenza A swab, as well as negative cardiac work-up, negative D-dimer, and chest x-ray.  Discussed plan for discharge and treatment of bronchitis due to influenza A.  Patient expressed understanding and agrees with plan. [ARIANNE]      ED Course User Index  [ARIANNE] Abdon Sterling, PA           PPE: Both the patient and I wore a surgical mask throughout the entire patient encounter. I wore protective goggles.     Diagnosis  Final diagnoses:   Influenza A   Bronchitis   Hypertension, unspecified type   History of asthma        Kenji Reyes MD  12/25/22 1473

## 2022-12-25 NOTE — DISCHARGE INSTRUCTIONS
Home, rest, medicine as prescribed, keep well-hydrated, Tylenol or ibuprofen as needed for fever or aches.  Follow up with PCP for recheck. Return to care if symptoms worsen or with further concerns.

## 2022-12-25 NOTE — ED TRIAGE NOTES
Pt arrives to ED via PV from home with c/o cough/sore throat/no voice/difficulty breathing.  Pt states her doctors told her she has flu/PNA/pulmonary edema on Thursday.  Pt has been started on ABX for the PNA but nothing for the pulmonary edema.  Pt states she has already finished taking Tamiflu.      Patient was placed in face mask during first look triage.  Patient was wearing a face mask throughout encounter.  I wore personal protective equipment throughout the encounter.  Hand hygiene was performed before and after patient encounter.

## 2022-12-30 LAB
BACTERIA SPEC AEROBE CULT: NORMAL
BACTERIA SPEC AEROBE CULT: NORMAL

## 2023-02-06 DIAGNOSIS — N76.0 VULVOVAGINITIS: ICD-10-CM

## 2023-02-07 RX ORDER — ACYCLOVIR 400 MG/1
400 TABLET ORAL 2 TIMES DAILY
Qty: 60 TABLET | Refills: 3 | Status: SHIPPED | OUTPATIENT
Start: 2023-02-07 | End: 2023-03-09

## 2023-02-07 RX ORDER — ACYCLOVIR 50 MG/G
OINTMENT TOPICAL
Qty: 15 G | Refills: 0 | Status: SHIPPED | OUTPATIENT
Start: 2023-02-07

## 2023-02-07 NOTE — TELEPHONE ENCOUNTER
Med refill. AE 5/16/23. Melva wants to switch to daily treatment - suppression of herpes. Can you switch it to that please? [facility] on file. Thank you.

## 2023-06-09 ENCOUNTER — APPOINTMENT (OUTPATIENT)
Dept: GENERAL RADIOLOGY | Facility: HOSPITAL | Age: 33
End: 2023-06-09
Payer: COMMERCIAL

## 2023-06-09 ENCOUNTER — HOSPITAL ENCOUNTER (EMERGENCY)
Facility: HOSPITAL | Age: 33
Discharge: HOME | End: 2023-06-10
Attending: EMERGENCY MEDICINE
Payer: COMMERCIAL

## 2023-06-09 DIAGNOSIS — R51.9 ACUTE NONINTRACTABLE HEADACHE, UNSPECIFIED HEADACHE TYPE: ICD-10-CM

## 2023-06-09 DIAGNOSIS — S16.1XXA ACUTE CERVICAL MYOFASCIAL STRAIN, INITIAL ENCOUNTER: ICD-10-CM
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
## 2023-06-09 DIAGNOSIS — R07.89 CHEST WALL PAIN: ICD-10-CM

## 2023-06-09 PROCEDURE — 99284 EMERGENCY DEPT VISIT MOD MDM: CPT

## 2023-06-09 PROCEDURE — 71045 X-RAY EXAM CHEST 1 VIEW: CPT

## 2023-06-09 RX ORDER — IBUPROFEN 800 MG/1
800 TABLET ORAL ONCE
Status: COMPLETED | OUTPATIENT
Start: 2023-06-10 | End: 2023-06-10

## 2023-06-09 RX ORDER — HYDROCODONE BITARTRATE AND ACETAMINOPHEN 5; 325 MG/1; MG/1
1 TABLET ORAL ONCE
Status: COMPLETED | OUTPATIENT
Start: 2023-06-10 | End: 2023-06-10

## 2023-06-09 NOTE — Clinical Note
[facility] EMERGENCY DEPARTMENT  [address] [facility] [address]  Phone: [phone]    Melva MANCERA was seen and treated in our emergency department on 6/9/2023.  She may return to work on 06/13/2023.         Thank you for choosing [facility].    Kenji Reyes MD

## 2023-06-09 NOTE — Clinical Note
[facility] EMERGENCY DEPARTMENT  [address] [facility] [address]  Phone: [phone]    Melva MANCERA was seen and treated in our emergency department on 6/9/2023.  She may return to work on 06/13/2023.         Thank you for choosing [facility].    Kenji Reyes MD

## 2023-06-09 NOTE — Clinical Note
[facility] EMERGENCY DEPARTMENT  [address] [facility] [address]  Phone: [phone]    Melva MANCERA was seen and treated in our emergency department on 6/9/2023.  She may return to work on 06/12/2023.         Thank you for choosing [facility].    Abdon Sterling, PA

## 2023-06-09 NOTE — Clinical Note
[facility] EMERGENCY DEPARTMENT  [address] [facility] [address]  Phone: [phone]    Melva MANCERA was seen and treated in our emergency department on 6/9/2023.  She may return to work on 06/12/2023.         Thank you for choosing [facility].    Abdon Sterling, PA

## 2023-06-10 ENCOUNTER — APPOINTMENT (OUTPATIENT)
Dept: CT IMAGING | Facility: HOSPITAL | Age: 33
End: 2023-06-10
Payer: COMMERCIAL

## 2023-06-10 VITALS
DIASTOLIC BLOOD PRESSURE: 86 MMHG | HEIGHT: 67 IN | SYSTOLIC BLOOD PRESSURE: 141 MMHG | HEART RATE: 74 BPM | RESPIRATION RATE: 18 BRPM | OXYGEN SATURATION: 92 % | TEMPERATURE: 99.2 F | BODY MASS INDEX: 40.41 KG/M2

## 2023-06-10 PROCEDURE — 72125 CT NECK SPINE W/O DYE: CPT

## 2023-06-10 PROCEDURE — 70450 CT HEAD/BRAIN W/O DYE: CPT

## 2023-06-10 RX ORDER — DICLOFENAC SODIUM 75 MG/1
75 TABLET, DELAYED RELEASE ORAL 2 TIMES DAILY
Qty: 20 TABLET | Refills: 0 | Status: SHIPPED | OUTPATIENT
Start: 2023-06-10 | End: 2023-06-10 | Stop reason: SDUPTHER

## 2023-06-10 RX ORDER — METHOCARBAMOL 500 MG/1
1000 TABLET, FILM COATED ORAL 4 TIMES DAILY
Qty: 40 TABLET | Refills: 0 | Status: SHIPPED | OUTPATIENT
Start: 2023-06-10

## 2023-06-10 RX ORDER — DICLOFENAC SODIUM 75 MG/1
75 TABLET, DELAYED RELEASE ORAL 2 TIMES DAILY
Qty: 20 TABLET | Refills: 0 | Status: SHIPPED | OUTPATIENT
Start: 2023-06-10

## 2023-06-10 RX ORDER — METHOCARBAMOL 500 MG/1
1000 TABLET, FILM COATED ORAL 4 TIMES DAILY
Qty: 40 TABLET | Refills: 0 | Status: SHIPPED | OUTPATIENT
Start: 2023-06-10 | End: 2023-06-10 | Stop reason: SDUPTHER

## 2023-06-10 RX ADMIN — HYDROCODONE BITARTRATE AND ACETAMINOPHEN 1 TABLET: 5; 325 TABLET ORAL at 00:05

## 2023-06-10 RX ADMIN — IBUPROFEN 800 MG: 800 TABLET, FILM COATED ORAL at 00:04

## 2023-06-10 NOTE — DISCHARGE INSTRUCTIONS
Home, rest, medicine as directed, apply heat or ice to affected areas.  Home medicine as prescribed, follow up with PCP for recheck. Return to care with further concerns.
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
## 2023-07-25 RX ORDER — ACYCLOVIR 400 MG/1
400 TABLET ORAL 2 TIMES DAILY
Qty: 60 TABLET | Refills: 3 | Status: SHIPPED | OUTPATIENT
Start: 2023-07-25 | End: 2023-07-27

## 2023-07-27 DIAGNOSIS — N76.0 VULVOVAGINITIS: ICD-10-CM

## 2023-07-27 RX ORDER — ACYCLOVIR 50 MG/G
OINTMENT TOPICAL
Qty: 30 G | Refills: 1 | Status: SHIPPED | OUTPATIENT
Start: 2023-07-27

## 2023-07-27 RX ORDER — ACYCLOVIR 400 MG/1
400 TABLET ORAL 2 TIMES DAILY
Qty: 180 TABLET | Refills: 1 | Status: SHIPPED | OUTPATIENT
Start: 2023-07-27 | End: 2023-11-24

## 2023-07-27 NOTE — TELEPHONE ENCOUNTER
Med refill. Last seen 11/26/22. You refilled the acyclovir tablets on 7/25 - 60 tablets with 3 refills. She is asking for the Acydlovir 5% ointment. [facility]. Thank you

## 2023-08-11 ENCOUNTER — TELEPHONE (OUTPATIENT)
Dept: CARDIOLOGY | Facility: CLINIC | Age: 33
End: 2023-08-11

## 2023-08-11 NOTE — TELEPHONE ENCOUNTER
Caller: Melva Hdez    Relationship to patient: Self    Best call back number: [phone]    Chief complaint: WOULD LIKE TO RESCHEDULE STRESS TEST FOR GASTRIC SLEEVE    Type of visit: STRESS TEST    Requested date: ASAP    If rescheduling, when is the original appointment:  11.21.22    Additional notes: NONE

## 2023-08-24 DIAGNOSIS — R06.09 EXERTIONAL DYSPNEA: Primary | ICD-10-CM

## 2023-09-05 ENCOUNTER — HOSPITAL ENCOUNTER (OUTPATIENT)
Dept: CARDIOLOGY | Facility: HOSPITAL | Age: 33
Discharge: HOME | End: 2023-09-05
Admitting: INTERNAL MEDICINE
Payer: COMMERCIAL

## 2023-09-05 VITALS — BODY MASS INDEX: 40.81 KG/M2 | HEIGHT: 67 IN | WEIGHT: 260 LBS

## 2023-09-05 DIAGNOSIS — R06.09 EXERTIONAL DYSPNEA: ICD-10-CM

## 2023-09-05 LAB
BH CV NUCLEAR PRIOR STUDY: 2
BH CV STRESS BP STAGE 1: NORMAL
BH CV STRESS COMMENTS STAGE 1: NORMAL
BH CV STRESS DOSE REGADENOSON STAGE 1: 0.4
BH CV STRESS DURATION MIN STAGE 1: 0
BH CV STRESS DURATION SEC STAGE 1: 10
BH CV STRESS HR STAGE 1: 138
BH CV STRESS NUCLEAR ISOTOPE DOSE: 37.8 MCI
BH CV STRESS PROTOCOL 1: NORMAL
BH CV STRESS RECOVERY BP: NORMAL MMHG
BH CV STRESS RECOVERY HR: 103 BPM
BH CV STRESS STAGE 1: 1
LV EF NUC BP: 61 %
MAXIMAL PREDICTED HEART RATE: 187 BPM
PERCENT MAX PREDICTED HR: 73.8 %
STRESS BASELINE BP: NORMAL MMHG
STRESS BASELINE HR: 86 BPM
STRESS PERCENT HR: 87 %
STRESS POST EXERCISE DUR SEC: 10 SEC
STRESS POST PEAK BP: NORMAL MMHG
STRESS POST PEAK HR: 138 BPM
STRESS TARGET HR: 159 BPM

## 2023-09-05 PROCEDURE — 78451 HT MUSCLE IMAGE SPECT SING: CPT | Performed by: INTERNAL MEDICINE

## 2023-09-05 PROCEDURE — 93017 CV STRESS TEST TRACING ONLY: CPT

## 2023-09-05 PROCEDURE — 93016 CV STRESS TEST SUPVJ ONLY: CPT | Performed by: INTERNAL MEDICINE

## 2023-09-05 PROCEDURE — A9502 TC99M TETROFOSMIN: HCPCS | Performed by: INTERNAL MEDICINE

## 2023-09-05 PROCEDURE — 0 TECHNETIUM TETROFOSMIN KIT: Performed by: INTERNAL MEDICINE

## 2023-09-05 PROCEDURE — 78451 HT MUSCLE IMAGE SPECT SING: CPT

## 2023-09-05 PROCEDURE — 93018 CV STRESS TEST I&R ONLY: CPT | Performed by: INTERNAL MEDICINE

## 2023-09-05 PROCEDURE — 25010000002 REGADENOSON 0.4 MG/5ML SOLUTION: Performed by: INTERNAL MEDICINE

## 2023-09-05 RX ORDER — REGADENOSON 0.08 MG/ML
0.4 INJECTION, SOLUTION INTRAVENOUS
Status: COMPLETED | OUTPATIENT
Start: 2023-09-05 | End: 2023-09-05

## 2023-09-05 RX ADMIN — REGADENOSON 0.4 MG: 0.08 INJECTION, SOLUTION INTRAVENOUS at 08:22

## 2023-09-05 RX ADMIN — TETROFOSMIN 1 DOSE: 1.38 INJECTION, POWDER, LYOPHILIZED, FOR SOLUTION INTRAVENOUS at 08:22

## 2023-09-06 ENCOUNTER — TELEPHONE (OUTPATIENT)
Dept: CARDIOLOGY | Facility: CLINIC | Age: 33
End: 2023-09-06
Payer: COMMERCIAL

## 2023-09-06 NOTE — TELEPHONE ENCOUNTER
----- Message from Abdon Sanders Jr., MD sent at 9/5/2023  1:32 PM EDT -----  Please let patient know this is a normal stress study

## 2023-09-06 NOTE — TELEPHONE ENCOUNTER
Attempted to call patient, but no answer and no option to leave VM. Will continue to try and reach patient.     Klaudia Overton RN  Triage [facility]

## 2023-09-07 NOTE — TELEPHONE ENCOUNTER
Called and left VM. Will continue to try to reach patient.     Klaudia Overton RN  Triage [facility]

## 2023-09-07 NOTE — TELEPHONE ENCOUNTER
Melva Hdez returned call.    Reviewed results with patient and he verbalized understanding of results.    Thank you,  Pam CARL RN  Triage Nurse [facility]   12:09 EDT

## 2023-09-12 DIAGNOSIS — N76.0 VULVOVAGINITIS: ICD-10-CM

## 2023-09-12 RX ORDER — ACYCLOVIR 50 MG/G
OINTMENT TOPICAL
Qty: 30 G | Refills: 1 | Status: SHIPPED | OUTPATIENT
Start: 2023-09-12

## 2023-10-24 ENCOUNTER — OFFICE VISIT (OUTPATIENT)
Dept: OBSTETRICS AND GYNECOLOGY | Facility: CLINIC | Age: 33
End: 2023-10-24
Payer: COMMERCIAL

## 2023-10-24 VITALS
HEIGHT: 67 IN | WEIGHT: 263 LBS | DIASTOLIC BLOOD PRESSURE: 62 MMHG | SYSTOLIC BLOOD PRESSURE: 100 MMHG | BODY MASS INDEX: 41.28 KG/M2

## 2023-10-24 DIAGNOSIS — R35.0 URINARY FREQUENCY: Primary | ICD-10-CM

## 2023-10-24 DIAGNOSIS — Z01.419 VISIT FOR GYNECOLOGIC EXAMINATION: ICD-10-CM

## 2023-10-24 DIAGNOSIS — F32.81 PREMENSTRUAL DYSPHORIC DISORDER: ICD-10-CM

## 2023-10-24 DIAGNOSIS — N94.6 DYSMENORRHEA: ICD-10-CM

## 2023-10-24 LAB
BILIRUB BLD-MCNC: NEGATIVE MG/DL
EXPIRATION DATE: NORMAL
GLUCOSE UR STRIP-MCNC: NEGATIVE MG/DL
KETONES UR QL: NEGATIVE
LEUKOCYTE EST, POC: NEGATIVE
Lab: NORMAL
NITRITE UR-MCNC: NEGATIVE MG/ML
PH UR: 7.5 [PH] (ref 5–8)
PROT UR STRIP-MCNC: NEGATIVE MG/DL
RBC # UR STRIP: NEGATIVE /UL
SP GR UR: 1.02 (ref 1–1.03)
UROBILINOGEN UR QL: NORMAL

## 2023-10-24 RX ORDER — ONDANSETRON 4 MG/1
4 TABLET, ORALLY DISINTEGRATING ORAL
COMMUNITY
Start: 2022-12-22

## 2023-10-24 RX ORDER — LORATADINE/PSEUDOEPHEDRINE 5 MG-120MG
TABLET, EXTENDED RELEASE 12 HR ORAL
COMMUNITY
Start: 2023-09-27

## 2023-10-24 RX ORDER — ZINC OXIDE 400 MG/G
1 PASTE TOPICAL
COMMUNITY
Start: 2023-09-27

## 2023-10-24 RX ORDER — FLUTICASONE PROPIONATE 50 MCG
2 SPRAY, SUSPENSION (ML) NASAL DAILY
COMMUNITY
Start: 2023-10-19

## 2023-10-24 RX ORDER — BUPROPION HYDROCHLORIDE 150 MG/1
300 TABLET ORAL DAILY
COMMUNITY

## 2023-10-24 RX ORDER — HYDROXYZINE HYDROCHLORIDE 25 MG/1
25 TABLET, FILM COATED ORAL
COMMUNITY

## 2023-10-24 NOTE — PROGRESS NOTES
[facility]  [address], [address]  [place], [place] [address]  Phone: [phone] / Fax:  [phone]      10/24/2023    [address] [facility] [address]    Reymundo Hernandez MD    Chief Complaint   Patient presents with    Gynecologic Exam     Annual Exam, last pap 5-10-22 NL HPV (-), 10-22-19 NL HPV (-). Patient with Mirena IUD in place 6-21-22. Patient c/o frequency with urinary. Cc u/a is negative. Patient is questioning if she has PMDD-Hypersentivity, mood swings, pain before, during and after her cycle.       Melva Hdez is here for annual gynecologic exam.  HPI - Patient with normal pap one year ago.  She had a Mirena IUD placed one year ago and is generally satisfied with it for contraception; however, she reports PMDD symptoms such as moodiness, irritability and cramping before cycle.    Past Medical History:   Diagnosis Date    ADHD     Anxiety     Asthma     Bronchitis     Chlamydia     Chronic sinus infection     COVID-19 01/2022    Depression     Eczema     Gestational hypertension     PTSD (post-traumatic stress disorder)     COMPLEX    Urinary tract infection     Urogenital trichomoniasis        Past Surgical History:   Procedure Laterality Date    WISDOM TOOTH EXTRACTION         No Known Allergies    Social History     Socioeconomic History    Marital status:     Number of children: 1   Tobacco Use    Smoking status: Never    Smokeless tobacco: Never   Vaping Use    Vaping Use: Never used   Substance and Sexual Activity    Alcohol use: Yes     Comment: social    Drug use: Yes     Types: Marijuana     Comment: occ    Sexual activity: Yes     Partners: Male     Birth control/protection: I.U.D.     Comment: Mirena       Family History   Problem Relation Age of Onset    Heart attack Father     Diabetes Father     Cancer Mother         BLOOD    Bipolar disorder Mother     Hypertension Mother     Anxiety disorder Sister     Diabetes Paternal Grandmother     Heart  "disease Maternal Grandmother     Lung cancer Maternal Grandfather     Alcohol abuse Maternal Uncle     Drug abuse Maternal Uncle     Breast cancer Neg Hx     Colon cancer Neg Hx        Patient's last menstrual period was 10/19/2023 (exact date).    OB History          1    Para   1    Term   1            AB        Living   1         SAB        IAB        Ectopic        Molar        Multiple   0    Live Births   1                Vitals:    10/24/23 1037   BP: 100/62   Weight: 119 kg (263 lb)   Height: 170.2 cm (67.01\")       Physical Exam  Constitutional:       Appearance: Normal appearance. She is well-developed.   Genitourinary:      Urethral meatus normal.      Right Labia: No tenderness or lesions.     Left Labia: No tenderness or lesions.     No vaginal discharge or tenderness.        Right Adnexa: not tender and not full.     Left Adnexa: not tender and not full.     No cervical motion tenderness or lesion.      IUD strings visualized.      Uterus is not enlarged or tender.      No urethral prolapse present.   Breasts:     Right: No mass or nipple discharge.      Left: No mass or nipple discharge.   HENT:      Right Ear: External ear normal.      Left Ear: External ear normal.      Nose: Nose normal.   Eyes:      Conjunctiva/sclera: Conjunctivae normal.   Neck:      Thyroid: No thyromegaly.   Cardiovascular:      Rate and Rhythm: Normal rate and regular rhythm.      Heart sounds: Normal heart sounds.   Pulmonary:      Effort: Pulmonary effort is normal.      Breath sounds: No stridor. No wheezing.   Abdominal:      Palpations: Abdomen is soft.      Tenderness: There is no abdominal tenderness. There is no guarding or rebound.   Musculoskeletal:         General: Normal range of motion.      Cervical back: Normal range of motion and neck supple.   Neurological:      Mental Status: She is alert.      Coordination: Coordination normal.   Skin:     General: Skin is warm and dry.   Psychiatric:         " Mood and Affect: Mood normal.         Behavior: Behavior normal.         Thought Content: Thought content normal.         Judgment: Judgment normal.   Vitals reviewed. Exam conducted with a chaperone present.         Diagnoses and all orders for this visit:    1. Urinary frequency (Primary)  -     Urine Culture - Urine, Urine, Clean Catch  -     Dipstick nonspecific.  Await culture.    2. Visit for gynecologic examination        -     Discussed importance of regular screening and breast awareness.    3. Dysmenorrhea        -     Trial of NSAIDs before menses.    4. Premenstrual dysphoric disorder        -     Complex issue given multiple psychotropic drugs.  Patient is planning to exercise more and lose weight which helps with PMDD.          -      Discussed changing contraception and patient declines.      Shane Long MD

## 2023-10-26 ENCOUNTER — TELEPHONE (OUTPATIENT)
Dept: OBSTETRICS AND GYNECOLOGY | Facility: CLINIC | Age: 33
End: 2023-10-26
Payer: COMMERCIAL

## 2023-10-26 LAB
BACTERIA UR CULT: NO GROWTH
BACTERIA UR CULT: NORMAL

## 2023-11-15 DIAGNOSIS — N76.0 VULVOVAGINITIS: ICD-10-CM

## 2023-11-15 RX ORDER — ACYCLOVIR 50 MG/G
OINTMENT TOPICAL
Qty: 30 G | Refills: 1 | Status: SHIPPED | OUTPATIENT
Start: 2023-11-15

## 2023-11-15 RX ORDER — ACYCLOVIR 400 MG/1
400 TABLET ORAL 2 TIMES DAILY
Qty: 180 TABLET | Refills: 1 | Status: SHIPPED | OUTPATIENT
Start: 2023-11-15 | End: 2024-03-14

## 2023-11-15 NOTE — TELEPHONE ENCOUNTER
[facility] pharmacy calling about Rx's. Looks like she may be trying to refill too early. Thank you

## 2023-11-22 ENCOUNTER — TELEPHONE (OUTPATIENT)
Dept: OBSTETRICS AND GYNECOLOGY | Facility: CLINIC | Age: 33
End: 2023-11-22
Payer: COMMERCIAL

## 2023-11-22 RX ORDER — LIDOCAINE HYDROCHLORIDE 30 MG/G
CREAM TOPICAL
Qty: 28 G | Refills: 0 | Status: SHIPPED | OUTPATIENT
Start: 2023-11-22

## 2023-11-22 NOTE — TELEPHONE ENCOUNTER
Caller: Melva Hdez    Relationship: Self    Best call back number: [phone] / LVM    Requested Prescriptions: lidocaine 3 % cream cream     Pharmacy where request should be sent: [facility]     Last office visit with prescribing clinician: 10/24/2023   Last telemedicine visit with prescribing clinician: Visit date not found   Next office visit with prescribing clinician: 10/29/2024     Additional details provided by patient: PT IS VERY IRRITATED AND FULL OUTBREAK - PAINFUL WHEN PT URINATES - SHE DOESN'T WANT TO GO THE LONG HOLIDAY WEEKEND UNCOMFORTABLE AND IN PAIN - PT WOULD LIEK FOR SOMETHING TO BE CALLED IN TODAY AND LET PT KNOW - PT WANTS THE CLEAR CREAM AND THE HIGHEST DOSAGE THAT SHE CAN HAVE    Does the patient have less than a 3 day supply:  [x] Yes  [] No    Would you like a call back once the refill request has been completed: [] Yes [x] No    If the office needs to give you a call back, can they leave a voicemail: [x] Yes [] No    Adam Dupont Rep   11/22/23 09:01 EST

## 2024-01-08 ENCOUNTER — OFFICE VISIT (OUTPATIENT)
Dept: OBSTETRICS AND GYNECOLOGY | Facility: CLINIC | Age: 34
End: 2024-01-08
Payer: COMMERCIAL

## 2024-01-08 VITALS
WEIGHT: 252.4 LBS | SYSTOLIC BLOOD PRESSURE: 108 MMHG | BODY MASS INDEX: 39.62 KG/M2 | HEIGHT: 67 IN | DIASTOLIC BLOOD PRESSURE: 75 MMHG | HEART RATE: 68 BPM

## 2024-01-08 DIAGNOSIS — R82.90 BAD ODOR OF URINE: Primary | ICD-10-CM

## 2024-01-08 DIAGNOSIS — N93.8 DYSFUNCTIONAL UTERINE BLEEDING: ICD-10-CM

## 2024-01-08 LAB
BILIRUB BLD-MCNC: NEGATIVE MG/DL
CLARITY, POC: CLEAR
COLOR UR: ABNORMAL
GLUCOSE UR STRIP-MCNC: NEGATIVE MG/DL
KETONES UR QL: ABNORMAL
LEUKOCYTE EST, POC: NEGATIVE
NITRITE UR-MCNC: NEGATIVE MG/ML
PH UR: 5.5 [PH] (ref 5–8)
PROT UR STRIP-MCNC: ABNORMAL MG/DL
RBC # UR STRIP: NEGATIVE /UL
SP GR UR: 1.03 (ref 1–1.03)
UROBILINOGEN UR QL: ABNORMAL

## 2024-01-08 PROCEDURE — 81002 URINALYSIS NONAUTO W/O SCOPE: CPT | Performed by: OBSTETRICS & GYNECOLOGY

## 2024-01-08 PROCEDURE — 99213 OFFICE O/P EST LOW 20 MIN: CPT | Performed by: OBSTETRICS & GYNECOLOGY

## 2024-01-08 RX ORDER — CLOBETASOL PROPIONATE 0.5 MG/G
CREAM TOPICAL
COMMUNITY
Start: 2023-10-28

## 2024-01-08 RX ORDER — DEXTROMETHORPHAN HYDROBROMIDE AND PROMETHAZINE HYDROCHLORIDE 15; 6.25 MG/5ML; MG/5ML
5 SYRUP ORAL
COMMUNITY
Start: 2023-11-03

## 2024-01-08 RX ORDER — SERDEXMETHYLPHENIDATE AND DEXMETHYLPHENIDATE 7.8; 39.2 MG/1; MG/1
CAPSULE ORAL
COMMUNITY

## 2024-01-08 RX ORDER — BUPROPION HYDROCHLORIDE 300 MG/1
300 TABLET ORAL EVERY MORNING
COMMUNITY
Start: 2023-12-09

## 2024-01-09 NOTE — PROGRESS NOTES
Subjective   Melva Hdez is a 33 y.o. female.     Cc:  Three episodes of bleeding in one month.    History of Present Illness - Patient is a 33 year old female who reports irregular bleeding last month.  Patient has an Mirena IUD in place for one year and generally reports no previous issues with IUD.  However, in December, she had 3 episodes of bleeding.  Bleeding was moderate at worst but generally was light but persistent.  She is not on any other hormonal medications other than IUD.  In addition, patient reports malodorous urine.    The following portions of the patient's history were reviewed and updated as appropriate: She  has a past medical history of ADHD, Anxiety, Asthma, Bronchitis, Chlamydia, Chronic sinus infection, COVID-19 (01/2022), Depression, Eczema, Gestational hypertension, PTSD (post-traumatic stress disorder), Urinary tract infection, and Urogenital trichomoniasis.  She  reports that she has never smoked. She has never used smokeless tobacco. She reports current alcohol use. She reports current drug use. Drug: Marijuana.  Current Outpatient Medications   Medication Sig Dispense Refill    acyclovir (ZOVIRAX) 400 MG tablet Take 1 tablet by mouth 2 (Two) Times a Day for 120 days. Take no more than 5 doses a day. 180 tablet 1    acyclovir (ZOVIRAX) 5 % ointment APPLY THIN LAYER TO AFFECTED AREA AS DIRECTED 30 g 1    Aklief 0.005 % cream APPLY TO FACE EACH NIGHT AT BEDTIME      albuterol sulfate  (90 Base) MCG/ACT inhaler       amLODIPine (NORVASC) 5 MG tablet Take 1 tablet by mouth Daily.  1    azSTARys 39.2-7.8 MG capsule Take  by mouth.      buPROPion XL (WELLBUTRIN XL) 300 MG 24 hr tablet Take 1 tablet by mouth Every Morning.      clobetasol (TEMOVATE) 0.05 % cream APPLY TO AFFECTED AREAS EVERY DAY AS NEEDED      [facility] Allergy Relief-D12 5-120 MG per 12 hr tablet TAKE 1 TABLET BY MOUTH TWICE A DAY FOR 2 WEEKS      Desitin 40 % paste paste Apply 1 Application topically to the appropriate  area as directed.      escitalopram (LEXAPRO) 20 MG tablet Take 1 tablet by mouth Every Morning.  1    fluticasone (FLONASE) 50 MCG/ACT nasal spray 2 sprays by Each Nare route Daily.      hydrOXYzine (ATARAX) 25 MG tablet Take 1 tablet by mouth.      ketoconazole (NIZORAL) 2 % shampoo WASH IN SHOWER 2 TIMES PER WEEK      levonorgestrel (MIRENA) 20 MCG/24HR IUD 1 each by Intrauterine route 1 (One) Time.      lidocaine 3 % cream cream Apply to affected area tid prn, pain 28 g 0    montelukast (SINGULAIR) 10 MG tablet Take 1 tablet by mouth Daily.      multivitamin (THERAGRAN) tablet tablet Take  by mouth.      ondansetron ODT (ZOFRAN-ODT) 4 MG disintegrating tablet Take 1 tablet by mouth.      promethazine-dextromethorphan (PROMETHAZINE-DM) 6.25-15 MG/5ML syrup Take 5 mL by mouth.      propranolol LA (INDERAL LA) 80 MG 24 hr capsule TAKE 1 CAPSULE (80 MG TOTAL) BY MOUTH 1 (ONE) TIME EACH DAY. DO NOT CRUSH, CHEW, OR SPLIT.      SUMAtriptan (IMITREX) 50 MG tablet PLEASE SEE ATTACHED FOR DETAILED DIRECTIONS      traZODone (DESYREL) 50 MG tablet TAKE 1 OR 2 TABLETS BY MOUTH AT BEDTIME AS NEEDED FOR SLEEP       No current facility-administered medications for this visit.     She has No Known Allergies..    Review of Systems   Constitutional:  Negative for chills and fever.   Genitourinary:  Positive for vaginal bleeding.       Objective   Physical Exam  Vitals reviewed. Exam conducted with a chaperone present.   Constitutional:       Appearance: Normal appearance.   Genitourinary:     General: Normal vulva.      Exam position: Lithotomy position.      Labia:         Right: No rash or tenderness.         Left: No rash or tenderness.       Urethra: No prolapse or urethral lesion.      Vagina: Normal.      Cervix: Normal.      Uterus: Normal.       Adnexa: Right adnexa normal and left adnexa normal.      Comments: IUD strings noted to be appropriate.  Neurological:      Mental Status: She is alert.   Psychiatric:         Mood  and Affect: Mood normal.         Behavior: Behavior normal.         Thought Content: Thought content normal.         Judgment: Judgment normal.         Assessment & Plan   Diagnoses and all orders for this visit:    1. Bad odor of urine (Primary)  -     POC Urinalysis Dipstick  -     Urine Culture - Urine, Urine, Clean Catch  -     Urine dipstick unremarkable.  Await culture.    2. Dysfunctional uterine bleeding        -     Exam normal.  Reassurance given.  If patient has any additional bleeding, she should notify me and an ultrasound will be ordered.    Shane Long,

## 2024-01-11 ENCOUNTER — TELEPHONE (OUTPATIENT)
Dept: OBSTETRICS AND GYNECOLOGY | Facility: CLINIC | Age: 34
End: 2024-01-11

## 2024-01-11 LAB
BACTERIA UR CULT: NO GROWTH
BACTERIA UR CULT: NORMAL

## 2024-09-30 ENCOUNTER — OFFICE VISIT (OUTPATIENT)
Dept: OBSTETRICS AND GYNECOLOGY | Facility: CLINIC | Age: 34
End: 2024-09-30
Payer: COMMERCIAL

## 2024-09-30 VITALS
SYSTOLIC BLOOD PRESSURE: 115 MMHG | BODY MASS INDEX: 39.56 KG/M2 | WEIGHT: 261 LBS | HEIGHT: 68 IN | DIASTOLIC BLOOD PRESSURE: 75 MMHG

## 2024-09-30 DIAGNOSIS — N93.8 DYSFUNCTIONAL UTERINE BLEEDING: Primary | ICD-10-CM

## 2024-09-30 PROCEDURE — 99214 OFFICE O/P EST MOD 30 MIN: CPT | Performed by: OBSTETRICS & GYNECOLOGY

## 2024-09-30 RX ORDER — BROMPHENIRAMINE MALEATE, PSEUDOEPHEDRINE HYDROCHLORIDE, AND DEXTROMETHORPHAN HYDROBROMIDE 2; 30; 10 MG/5ML; MG/5ML; MG/5ML
SYRUP ORAL
COMMUNITY
Start: 2024-09-17

## 2024-09-30 RX ORDER — TRIAMCINOLONE ACETONIDE 1 MG/G
CREAM TOPICAL 2 TIMES DAILY
COMMUNITY
Start: 2024-05-06

## 2024-09-30 RX ORDER — BUPROPION HYDROCHLORIDE 150 MG/1
150 TABLET ORAL EVERY MORNING
COMMUNITY

## 2024-10-01 LAB
ERYTHROCYTE [DISTWIDTH] IN BLOOD BY AUTOMATED COUNT: 13.4 % (ref 11.7–15.4)
FSH SERPL-ACNC: 3.1 MIU/ML
HCT VFR BLD AUTO: 42.3 % (ref 34–46.6)
HGB BLD-MCNC: 13.4 G/DL (ref 11.1–15.9)
MCH RBC QN AUTO: 27.5 PG (ref 26.6–33)
MCHC RBC AUTO-ENTMCNC: 31.7 G/DL (ref 31.5–35.7)
MCV RBC AUTO: 87 FL (ref 79–97)
PLATELET # BLD AUTO: 416 X10E3/UL (ref 150–450)
PROLACTIN SERPL-MCNC: 11.5 NG/ML (ref 4.8–33.4)
RBC # BLD AUTO: 4.87 X10E6/UL (ref 3.77–5.28)
TSH SERPL DL<=0.005 MIU/L-ACNC: 1.24 UIU/ML (ref 0.45–4.5)
WBC # BLD AUTO: 9.3 X10E3/UL (ref 3.4–10.8)

## 2024-10-01 NOTE — PROGRESS NOTES
Subjective   Melva Hdez is a 34 y.o. female.     Cc:  Bleeding    History of Present Illness - Patient is a 34 year old female parous times one who presents for evaluation of irregular bleeding.  Patient notably has had an IUD in place since May 2022.  She began to experience irregular bleeding over the past 2 months with cramping.  She has had 3 bleeding episodes 2 weeks apart, each last 3 to 7 days.  She denies expulsion of IUD.    The following portions of the patient's history were reviewed and updated as appropriate: She  has a past medical history of ADHD, Anxiety, Asthma, Bronchitis, Chlamydia, Chronic sinus infection, COVID-19 (01/2022), Depression, Eczema, Gestational hypertension, PTSD (post-traumatic stress disorder), Urinary tract infection, and Urogenital trichomoniasis.  She  has a past surgical history that includes [place] tooth extraction.  Her family history includes Alcohol abuse in her maternal uncle; Anxiety disorder in her sister; Bipolar disorder in her mother; Cancer in her mother; Diabetes in her father and paternal grandmother; Drug abuse in her maternal uncle; Heart attack in her father; Heart disease in her maternal grandmother; Hypertension in her mother; Lung cancer in her maternal grandfather.  She  reports that she has never smoked. She has never used smokeless tobacco. She reports current alcohol use. She reports current drug use. Drug: Marijuana.  Current Outpatient Medications   Medication Sig Dispense Refill    Aklief 0.005 % cream APPLY TO FACE EACH NIGHT AT BEDTIME      albuterol sulfate  (90 Base) MCG/ACT inhaler       amLODIPine (NORVASC) 5 MG tablet Take 1 tablet by mouth Daily.  1    azSTARys 39.2-7.8 MG capsule Take  by mouth.      brompheniramine-pseudoephedrine-DM 30-2-10 MG/5ML syrup TAKE 5 ML BY MOUTH 4 TIMES A DAY AS NEEDED FOR COUGH AND CONGESTION FOR UP TO 10 DAYS      buPROPion XL (WELLBUTRIN XL) 150 MG 24 hr tablet Take 1 tablet by mouth Every Morning.       clobetasol (TEMOVATE) 0.05 % cream APPLY TO AFFECTED AREAS EVERY DAY AS NEEDED      [facility] Allergy Relief-D12 5-120 MG per 12 hr tablet TAKE 1 TABLET BY MOUTH TWICE A DAY FOR 2 WEEKS      Desitin 40 % paste paste Apply 1 Application topically to the appropriate area as directed.      escitalopram (LEXAPRO) 20 MG tablet Take 1 tablet by mouth Every Morning.  1    fluticasone (FLONASE) 50 MCG/ACT nasal spray 2 sprays by Each Nare route Daily.      hydrOXYzine (ATARAX) 25 MG tablet Take 1 tablet by mouth.      ketoconazole (NIZORAL) 2 % shampoo WASH IN SHOWER 2 TIMES PER WEEK      levonorgestrel (MIRENA) 20 MCG/24HR IUD 1 each by Intrauterine route 1 (One) Time.      lidocaine 3 % cream cream Apply to affected area tid prn, pain 28 g 0    montelukast (SINGULAIR) 10 MG tablet Take 1 tablet by mouth Daily.      multivitamin (THERAGRAN) tablet tablet Take  by mouth.      ondansetron ODT (ZOFRAN-ODT) 4 MG disintegrating tablet Take 1 tablet by mouth.      propranolol LA (INDERAL LA) 80 MG 24 hr capsule TAKE 1 CAPSULE (80 MG TOTAL) BY MOUTH 1 (ONE) TIME EACH DAY. DO NOT CRUSH, CHEW, OR SPLIT.      traZODone (DESYREL) 50 MG tablet TAKE 1 OR 2 TABLETS BY MOUTH AT BEDTIME AS NEEDED FOR SLEEP      triamcinolone (KENALOG) 0.1 % cream Apply  topically to the appropriate area as directed 2 (Two) Times a Day.      norethindrone-ethinyl estradiol (OVCON) 0.4-35 MG-MCG per tablet Take 1 tablet by mouth Daily. 28 tablet 0     No current facility-administered medications for this visit.     She has No Known Allergies..    Review of Systems   Constitutional:  Negative for chills and fever.   Genitourinary:  Positive for vaginal bleeding.       Objective   Physical Exam  Vitals reviewed. Exam conducted with a chaperone present.   Constitutional:       Appearance: Normal appearance.   Abdominal:      Tenderness: There is no abdominal tenderness. There is no guarding or rebound.   Genitourinary:     General: Normal vulva.      Exam  position: Lithotomy position.      Labia:         Right: No rash or tenderness.         Left: No rash or tenderness.       Vagina: Normal.      Cervix: Normal.      Uterus: Normal.       Comments: IUD string tips possibly visualized.  Neurological:      Mental Status: She is alert.   Psychiatric:         Mood and Affect: Mood normal.         Behavior: Behavior normal.         Thought Content: Thought content normal.         Judgment: Judgment normal.         Assessment & Plan   Diagnoses and all orders for this visit:    1. Dysfunctional uterine bleeding (Primary)  -     NuSwab VG+ - Swab, Vagina  -     norethindrone-ethinyl estradiol (OVCON) 0.4-35 MG-MCG per tablet; Take 1 tablet by mouth Daily.  Dispense: 28 tablet; Refill: 0  -     CBC (No Diff)  -     Follicle Stimulating Hormone  -     TSH  -     Prolactin  -     Unknown etiology.  Labs and sonogram ordered.  Trial of estrogen for endometrial stabilization.  Follow up after work up completed.    Shane Long MD

## 2024-10-02 ENCOUNTER — TELEPHONE (OUTPATIENT)
Dept: OBSTETRICS AND GYNECOLOGY | Facility: CLINIC | Age: 34
End: 2024-10-02
Payer: COMMERCIAL

## 2024-10-02 LAB
A VAGINAE DNA VAG QL NAA+PROBE: NORMAL SCORE
BVAB2 DNA VAG QL NAA+PROBE: NORMAL SCORE
C ALBICANS DNA VAG QL NAA+PROBE: NEGATIVE
C GLABRATA DNA VAG QL NAA+PROBE: NEGATIVE
C TRACH DNA SPEC QL NAA+PROBE: NEGATIVE
MEGA1 DNA VAG QL NAA+PROBE: NORMAL SCORE
N GONORRHOEA DNA VAG QL NAA+PROBE: NEGATIVE
T VAGINALIS DNA VAG QL NAA+PROBE: NEGATIVE

## 2024-10-02 NOTE — TELEPHONE ENCOUNTER
Melinda    Let her know that her labs were normal.      Find out if she has started on birth control pills.    Ethan

## 2024-10-02 NOTE — TELEPHONE ENCOUNTER
Left phone message for Melva that Dr Long said that your labs were normal. He would like to know if you have started on your birth control pills. Please call the office at [phone] or sent Dr Long a My Chart message to let him know. Thank you

## 2024-10-14 ENCOUNTER — OFFICE VISIT (OUTPATIENT)
Dept: OBSTETRICS AND GYNECOLOGY | Facility: CLINIC | Age: 34
End: 2024-10-14
Payer: COMMERCIAL

## 2024-10-14 VITALS
HEIGHT: 68 IN | BODY MASS INDEX: 39.86 KG/M2 | DIASTOLIC BLOOD PRESSURE: 76 MMHG | SYSTOLIC BLOOD PRESSURE: 115 MMHG | WEIGHT: 263 LBS

## 2024-10-14 DIAGNOSIS — N93.8 DYSFUNCTIONAL UTERINE BLEEDING: Primary | ICD-10-CM

## 2024-10-14 RX ORDER — ACYCLOVIR 400 MG/1
TABLET ORAL
COMMUNITY
Start: 2024-10-07

## 2024-10-15 NOTE — PROGRESS NOTES
Subjective   Melva Hdez is a 34 y.o. female.     Cc:  Bleeding    History of Present Illness - Patient is a 34 year old female with irregular bleeding patterns.  Work up was normal with regard to blood work.  However, sonogram revealed IUD in lower uterine segment.    The following portions of the patient's history were reviewed and updated as appropriate: She  has a past medical history of ADHD, Anxiety, Asthma, Bronchitis, Chlamydia, Chronic sinus infection, COVID-19 (01/2022), Depression, Eczema, Gestational hypertension, PTSD (post-traumatic stress disorder), Urinary tract infection, and Urogenital trichomoniasis.  She  reports that she has never smoked. She has never used smokeless tobacco. She reports current alcohol use. She reports current drug use. Drug: Marijuana.  Current Outpatient Medications   Medication Sig Dispense Refill    acyclovir (ZOVIRAX) 400 MG tablet TAKE 1 TABLET BY MOUTH 2 (TWO) TIMES A DAY  DAYS. TAKE NO MORE THAN 5 DOSES A DAY.      Aklief 0.005 % cream APPLY TO FACE EACH NIGHT AT BEDTIME      albuterol sulfate  (90 Base) MCG/ACT inhaler       amLODIPine (NORVASC) 5 MG tablet Take 1 tablet by mouth Daily.  1    azSTARys 39.2-7.8 MG capsule Take  by mouth.      brompheniramine-pseudoephedrine-DM 30-2-10 MG/5ML syrup TAKE 5 ML BY MOUTH 4 TIMES A DAY AS NEEDED FOR COUGH AND CONGESTION FOR UP TO 10 DAYS      buPROPion XL (WELLBUTRIN XL) 150 MG 24 hr tablet Take 1 tablet by mouth Every Morning.      clobetasol (TEMOVATE) 0.05 % cream APPLY TO AFFECTED AREAS EVERY DAY AS NEEDED      escitalopram (LEXAPRO) 20 MG tablet Take 1 tablet by mouth Every Morning.  1    fluticasone (FLONASE) 50 MCG/ACT nasal spray 2 sprays by Each Nare route Daily.      hydrOXYzine (ATARAX) 25 MG tablet Take 1 tablet by mouth.      levonorgestrel (MIRENA) 20 MCG/24HR IUD 1 each by Intrauterine route 1 (One) Time.      montelukast (SINGULAIR) 10 MG tablet Take 1 tablet by mouth Daily.       norethindrone-ethinyl estradiol (OVCON) 0.4-35 MG-MCG per tablet Take 1 tablet by mouth Daily. 28 tablet 0    propranolol LA (INDERAL LA) 80 MG 24 hr capsule TAKE 1 CAPSULE (80 MG TOTAL) BY MOUTH 1 (ONE) TIME EACH DAY. DO NOT CRUSH, CHEW, OR SPLIT.      traZODone (DESYREL) 50 MG tablet TAKE 1 OR 2 TABLETS BY MOUTH AT BEDTIME AS NEEDED FOR SLEEP      triamcinolone (KENALOG) 0.1 % cream Apply  topically to the appropriate area as directed 2 (Two) Times a Day.      [facility] Allergy Relief-D12 5-120 MG per 12 hr tablet TAKE 1 TABLET BY MOUTH TWICE A DAY FOR 2 WEEKS (Patient not taking: Reported on 10/14/2024)      Desitin 40 % paste paste Apply 1 Application topically to the appropriate area as directed. (Patient not taking: Reported on 10/14/2024)      ketoconazole (NIZORAL) 2 % shampoo WASH IN SHOWER 2 TIMES PER WEEK (Patient not taking: Reported on 10/14/2024)      lidocaine 3 % cream cream Apply to affected area tid prn, pain (Patient not taking: Reported on 10/14/2024) 28 g 0    multivitamin (THERAGRAN) tablet tablet Take  by mouth. (Patient not taking: Reported on 10/14/2024)      ondansetron ODT (ZOFRAN-ODT) 4 MG disintegrating tablet Take 1 tablet by mouth. (Patient not taking: Reported on 10/14/2024)       No current facility-administered medications for this visit.     She has No Known Allergies..    Review of Systems   Constitutional:  Negative for chills and fever.   Genitourinary:  Positive for vaginal bleeding.     Objective   Physical Exam  Vitals reviewed. Exam conducted with a chaperone present.   Constitutional:       Appearance: Normal appearance.   Neurological:      Mental Status: She is alert.   Psychiatric:         Mood and Affect: Mood normal.         Behavior: Behavior normal.         Thought Content: Thought content normal.         Judgment: Judgment normal.     Assessment & Plan   Diagnoses and all orders for this visit:    1. Dysfunctional uterine bleeding (Primary)  -  Malpositioned IUD.   Recommend either removal and alternative contraception OR replace IUD under ultrasound guidance.  Patient to replace IUD.    Shane Long MD

## 2024-10-24 ENCOUNTER — TELEPHONE (OUTPATIENT)
Dept: OBSTETRICS AND GYNECOLOGY | Facility: CLINIC | Age: 34
End: 2024-10-24
Payer: COMMERCIAL

## 2024-10-24 NOTE — TELEPHONE ENCOUNTER
Patient is scheduled for Monday the 11th with U/S.10-29-24/LW     I tried calling the patient regarding her IUD. This is covered through her pharmacy benefits. I have received the device. She may be scheduled for removal with re insertion via U/S. 10-24-24/LW

## 2024-10-25 ENCOUNTER — TELEPHONE (OUTPATIENT)
Dept: OBSTETRICS AND GYNECOLOGY | Facility: CLINIC | Age: 34
End: 2024-10-25

## 2024-10-25 NOTE — TELEPHONE ENCOUNTER
[facility] staff attempted to follow warm transfer process and was unsuccessful     Caller: Melva Hdez    Relationship to patient: Self    Best call back number: [phone] (home)     Patient is needing: RETURNING MISSED CALL ABOUT IUD.

## 2024-11-04 DIAGNOSIS — N93.8 DYSFUNCTIONAL UTERINE BLEEDING: ICD-10-CM

## 2024-11-11 ENCOUNTER — PROCEDURE VISIT (OUTPATIENT)
Dept: OBSTETRICS AND GYNECOLOGY | Facility: CLINIC | Age: 34
End: 2024-11-11
Payer: COMMERCIAL

## 2024-11-11 VITALS
BODY MASS INDEX: 40.32 KG/M2 | WEIGHT: 266 LBS | HEIGHT: 68 IN | DIASTOLIC BLOOD PRESSURE: 69 MMHG | SYSTOLIC BLOOD PRESSURE: 105 MMHG

## 2024-11-11 DIAGNOSIS — Z86.19 HISTORY OF HERPES GENITALIS: ICD-10-CM

## 2024-11-11 DIAGNOSIS — Z30.433 ENCOUNTER FOR REMOVAL AND REINSERTION OF IUD: Primary | ICD-10-CM

## 2024-11-11 DIAGNOSIS — N89.8 VAGINAL DISCHARGE: ICD-10-CM

## 2024-11-11 RX ORDER — LIDOCAINE HYDROCHLORIDE 30 MG/G
CREAM TOPICAL
Qty: 28 G | Refills: 0 | Status: SHIPPED | OUTPATIENT
Start: 2024-11-11

## 2024-11-11 RX ORDER — VALACYCLOVIR HYDROCHLORIDE 500 MG/1
500 TABLET, FILM COATED ORAL DAILY
Qty: 30 TABLET | Refills: 5 | Status: SHIPPED | OUTPATIENT
Start: 2024-11-11 | End: 2025-05-10

## 2024-11-11 NOTE — PROGRESS NOTES
Subjective   Melva Hdez is a 34 y.o. female.     Cc:  IUD, vaginal discharge, HSV meds    History of Present Illness - Patient is a 34 year old female here for 3 issues.  1)  IUD placement.  Patient with IUD localized to the cervical canal/lower uterine segment.  Patient with abnormal bleeding and diagnosis was made via sonogram.  She would like IUD replaced under ultrasound guidance.  2)  Vaginal discharge.  Patient with several day history of vaginal discharge with odor.  She has not tried any OTC medications.  3)  HSV.  Patient requests refills on HSV medication for suppression.    The following portions of the patient's history were reviewed and updated as appropriate: She  has a past medical history of ADHD, Anxiety, Asthma, Bronchitis, Chlamydia, Chronic sinus infection, COVID-19 (01/2022), Depression, Eczema, Gestational hypertension, PTSD (post-traumatic stress disorder), Urinary tract infection, and Urogenital trichomoniasis.  She  reports that she has never smoked. She has never used smokeless tobacco. She reports current alcohol use. She reports current drug use. Drug: Marijuana.  Current Outpatient Medications   Medication Sig Dispense Refill    Aklief 0.005 % cream APPLY TO FACE EACH NIGHT AT BEDTIME      albuterol sulfate  (90 Base) MCG/ACT inhaler       amLODIPine (NORVASC) 5 MG tablet Take 1 tablet by mouth Daily.  1    azSTARys 39.2-7.8 MG capsule Take  by mouth.      buPROPion XL (WELLBUTRIN XL) 150 MG 24 hr tablet Take 1 tablet by mouth Every Morning.      clobetasol (TEMOVATE) 0.05 % cream APPLY TO AFFECTED AREAS EVERY DAY AS NEEDED      Desitin 40 % paste paste Apply 1 Application topically to the appropriate area as directed.      escitalopram (LEXAPRO) 20 MG tablet Take 1 tablet by mouth Every Morning.  1    fluticasone (FLONASE) 50 MCG/ACT nasal spray 2 sprays by Each Nare route Daily.      hydrOXYzine (ATARAX) 25 MG tablet Take 1 tablet by mouth.      ketoconazole (NIZORAL) 2 %  shampoo       lidocaine 3 % cream cream Apply to affected area tid prn, pain 28 g 0    montelukast (SINGULAIR) 10 MG tablet Take 1 tablet by mouth Daily.      multivitamin (THERAGRAN) tablet tablet Take  by mouth.      ondansetron ODT (ZOFRAN-ODT) 4 MG disintegrating tablet Take 1 tablet by mouth.      propranolol LA (INDERAL LA) 80 MG 24 hr capsule TAKE 1 CAPSULE (80 MG TOTAL) BY MOUTH 1 (ONE) TIME EACH DAY. DO NOT CRUSH, CHEW, OR SPLIT.      traZODone (DESYREL) 50 MG tablet TAKE 1 OR 2 TABLETS BY MOUTH AT BEDTIME AS NEEDED FOR SLEEP      triamcinolone (KENALOG) 0.1 % cream Apply  topically to the appropriate area as directed 2 (Two) Times a Day.      brompheniramine-pseudoephedrine-DM 30-2-10 MG/5ML syrup TAKE 5 ML BY MOUTH 4 TIMES A DAY AS NEEDED FOR COUGH AND CONGESTION FOR UP TO 10 DAYS (Patient not taking: Reported on 11/11/2024)      [facility] Allergy Relief-D12 5-120 MG per 12 hr tablet TAKE 1 TABLET BY MOUTH TWICE A DAY FOR 2 WEEKS (Patient not taking: Reported on 11/11/2024)      levonorgestrel (MIRENA) 20 MCG/24HR IUD 1 each by Intrauterine route 1 (One) Time.      Levonorgestrel (Mirena, 52 MG,) 20 MCG/DAY intrauterine device IUD To be inserted one time by prescriber. Route intrauterine. 1 each 0    norethindrone-ethinyl estradiol (OVCON) 0.4-35 MG-MCG per tablet Take 1 tablet by mouth Daily for 28 days. (Patient not taking: Reported on 11/11/2024) 28 tablet 0    valACYclovir (Valtrex) 500 MG tablet Take 1 tablet by mouth Daily for 180 days. 30 tablet 5     No current facility-administered medications for this visit.     She has No Known Allergies..    Review of Systems   Genitourinary:  Positive for vaginal discharge.       Objective   Physical Exam  Vitals reviewed. Exam conducted with a chaperone present.   Constitutional:       Appearance: Normal appearance.   Genitourinary:     General: Normal vulva.      Exam position: Lithotomy position.      Labia:         Right: No rash or tenderness.         Left: No  rash or tenderness.       Vagina: Normal. Vaginal discharge present.      Cervix: Normal.   Neurological:      Mental Status: She is alert.   Psychiatric:         Mood and Affect: Mood normal.         Behavior: Behavior normal.         Thought Content: Thought content normal.         Judgment: Judgment normal.     Procedures  IUD Insertion Procedure Note    Indication:  replacement of IUD due to mechanical displacement.    Procedure Details   Urine pregnancy test was not done.  The risks (including infection, bleeding, pain, and uterine perforation) and benefits of the procedure were explained to the patient and Verbal informed consent was obtained.      Cervix cleansed with Betadine. Uterus sounded to 7 cm. IUD inserted without difficulty. String visible and trimmed.    IUD Information:  Mirena, Lot # [id number], Expiration date 11/2026, NDC 85785-992-64.    Condition:  Stable    Complications:  None    Patient tolerated the procedure well without complications.    Plan:  The patient was advised to call for any fever or for prolonged or severe pain or bleeding. She was advised to use NSAID as needed for mild to moderate pain.     Attending Physician Documentation:  I was present for the entire procedure.      Assessment & Plan   Diagnoses and all orders for this visit:    1. Encounter for removal and reinsertion of IUD (Primary)  -     Levonorgestrel (MIRENA) 20 MCG/DAY IUD intrauterine device 1 each  -     Patient to follow up in one month for ultrasound check of IUD.    2. Vaginal discharge  -     NuSwab VG+ - Swab, Vagina  -     Await culture.    3. History of herpes genitalis        -     Valtrex and lidocaine ointment prescribed.    Shane Long MD

## 2024-11-13 ENCOUNTER — TELEPHONE (OUTPATIENT)
Dept: OBSTETRICS AND GYNECOLOGY | Facility: CLINIC | Age: 34
End: 2024-11-13
Payer: COMMERCIAL

## 2024-11-13 LAB
A VAGINAE DNA VAG QL NAA+PROBE: ABNORMAL SCORE
BVAB2 DNA VAG QL NAA+PROBE: ABNORMAL SCORE
C ALBICANS DNA VAG QL NAA+PROBE: POSITIVE
C GLABRATA DNA VAG QL NAA+PROBE: NEGATIVE
C TRACH DNA SPEC QL NAA+PROBE: NEGATIVE
MEGA1 DNA VAG QL NAA+PROBE: ABNORMAL SCORE
N GONORRHOEA DNA VAG QL NAA+PROBE: NEGATIVE
T VAGINALIS DNA VAG QL NAA+PROBE: NEGATIVE

## 2024-11-13 RX ORDER — FLUCONAZOLE 150 MG/1
150 TABLET ORAL ONCE
Qty: 1 TABLET | Refills: 0 | Status: SHIPPED | OUTPATIENT
Start: 2024-11-13 | End: 2024-11-13

## 2025-01-07 RX ORDER — ACYCLOVIR 400 MG/1
TABLET ORAL
Qty: 180 TABLET | Refills: 1 | Status: SHIPPED | OUTPATIENT
Start: 2025-01-07

## 2025-04-07 RX ORDER — VALACYCLOVIR HYDROCHLORIDE 500 MG/1
500 TABLET, FILM COATED ORAL DAILY
Qty: 90 TABLET | Refills: 1 | Status: SHIPPED | OUTPATIENT
Start: 2025-04-07

## 2025-04-07 NOTE — TELEPHONE ENCOUNTER
Pt requesting valtrex refill please.  Pharmacy -   [facility]/pharmacy #[id number] - [place], [place] - [address] AT [facility] - [phone]  - [phone] FX

## 2025-07-21 RX ORDER — ACYCLOVIR 400 MG/1
TABLET ORAL
Qty: 180 TABLET | Refills: 1 | Status: SHIPPED | OUTPATIENT
Start: 2025-07-21

## 2025-07-31 ENCOUNTER — OFFICE VISIT (OUTPATIENT)
Dept: OBSTETRICS AND GYNECOLOGY | Facility: CLINIC | Age: 35
End: 2025-07-31
Payer: COMMERCIAL

## 2025-07-31 VITALS
BODY MASS INDEX: 43 KG/M2 | WEIGHT: 274 LBS | HEIGHT: 67 IN | DIASTOLIC BLOOD PRESSURE: 72 MMHG | SYSTOLIC BLOOD PRESSURE: 108 MMHG

## 2025-07-31 DIAGNOSIS — Z01.419 VISIT FOR GYNECOLOGIC EXAMINATION: Primary | ICD-10-CM

## 2025-07-31 DIAGNOSIS — R10.2 PELVIC PAIN: ICD-10-CM

## 2025-07-31 LAB
BILIRUB BLD-MCNC: NEGATIVE MG/DL
EXPIRATION DATE: ABNORMAL
GLUCOSE UR STRIP-MCNC: NEGATIVE MG/DL
KETONES UR QL: NEGATIVE
LEUKOCYTE EST, POC: ABNORMAL
Lab: ABNORMAL
NITRITE UR-MCNC: NEGATIVE MG/ML
PH UR: 5.5 [PH] (ref 5–8)
PROT UR STRIP-MCNC: NEGATIVE MG/DL
RBC # UR STRIP: NEGATIVE /UL
SP GR UR: 1.02 (ref 1–1.03)
UROBILINOGEN UR QL: ABNORMAL

## 2025-07-31 PROCEDURE — 99395 PREV VISIT EST AGE 18-39: CPT | Performed by: OBSTETRICS & GYNECOLOGY

## 2025-07-31 NOTE — PROGRESS NOTES
[facility]  [address], [address]  [place], [place] [address]  Phone: [phone] / Fax:  [phone]      07/31/2025    [address] [facility] [address]    Cammy Murphy APRN    Chief Complaint   Patient presents with    Gynecologic Exam     Annual Exam, last pap 5-10-22 NL HPV (-). Patient with Mirena IUD in place 11-11-24. Patient c/o irregular bleeding for the past few months with cramping.  She is also c/o sweet odor with urination. Cc u/a Trace of Leukocytes.       Melva Hdez is here for annual gynecologic exam.  HPI - Patient with normal pap 3 years ago.  She had IUD placed last November and generally did well.  However, over the past month, she has had irregular bleeding and cramping.  She also has an unusual odor to urine.  She has some vaginal discharge.    Past Medical History:   Diagnosis Date    ADHD     Anxiety     Asthma     Bronchitis     Chlamydia     Chronic sinus infection     COVID-19 01/2022    Depression     Eczema     Gestational hypertension     PTSD (post-traumatic stress disorder)     COMPLEX    Urinary tract infection     Urogenital trichomoniasis        Past Surgical History:   Procedure Laterality Date    WISDOM TOOTH EXTRACTION         No Known Allergies    Social History     Socioeconomic History    Marital status:     Number of children: 1   Tobacco Use    Smoking status: Never    Smokeless tobacco: Never   Vaping Use    Vaping status: Never Used   Substance and Sexual Activity    Alcohol use: Yes     Comment: social    Drug use: Yes     Types: Marijuana     Comment: occ    Sexual activity: Yes     Partners: Male     Birth control/protection: I.U.D.     Comment: Mirena       Family History   Problem Relation Age of Onset    Heart attack Father     Diabetes Father     Cancer Mother         BLOOD    Bipolar disorder Mother     Hypertension Mother     Anxiety disorder Sister     Diabetes Paternal Grandmother     Heart disease Maternal  "Grandmother     Lung cancer Maternal Grandfather     Alcohol abuse Maternal Uncle     Drug abuse Maternal Uncle     Breast cancer Neg Hx     Colon cancer Neg Hx        No LMP recorded. (Menstrual status: Other).    OB History          1    Para   1    Term   1            AB        Living   1         SAB        IAB        Ectopic        Molar        Multiple   0    Live Births   1                Vitals:    25 1002   BP: 108/72   Weight: 124 kg (274 lb)   Height: 170.2 cm (67\")       Physical Exam  Constitutional:       Appearance: Normal appearance. She is well-developed.   Genitourinary:      Bladder and urethral meatus normal.      Right Labia: No tenderness or lesions.     Left Labia: No tenderness or lesions.     Vaginal discharge present.      No vaginal tenderness.        Right Adnexa: not tender and not full.     Left Adnexa: not tender and not full.     No cervical motion tenderness or lesion.      IUD strings visualized.      Uterus is not enlarged or tender.      No urethral tenderness or hypermobility present.   Breasts:     Right: No mass or nipple discharge.      Left: No mass or nipple discharge.   HENT:      Right Ear: External ear normal.      Left Ear: External ear normal.      Nose: Nose normal.   Eyes:      Conjunctiva/sclera: Conjunctivae normal.   Neck:      Thyroid: No thyromegaly.   Cardiovascular:      Rate and Rhythm: Normal rate and regular rhythm.      Heart sounds: Normal heart sounds.   Pulmonary:      Effort: Pulmonary effort is normal.      Breath sounds: No stridor. No wheezing.   Abdominal:      Palpations: Abdomen is soft.      Tenderness: There is no abdominal tenderness. There is no guarding or rebound.   Musculoskeletal:         General: Normal range of motion.      Cervical back: Normal range of motion and neck supple.   Neurological:      Mental Status: She is alert.      Coordination: Coordination normal.   Skin:     General: Skin is warm and dry. " "  Psychiatric:         Mood and Affect: Mood normal.         Behavior: Behavior normal.         Thought Content: Thought content normal.         Judgment: Judgment normal.   Vitals reviewed. Exam conducted with a chaperone present.         Diagnoses and all orders for this visit:    1. Visit for gynecologic examination (Primary)  -     IgP, Aptima HPV  -     Discussed importance of regular screening and breast awareness.    2. Pelvic pain  -     Urine Culture - Urine, Urine, Clean Catch  -     NuSwab VG+ - Swab, Vagina  -     Exam normal.  Dipstick not suggestive of UTI.  Await cultures.  Sonogram ordered.  If testing normal, patient to \"observe.\"  Discussed weight gain/obesity as cause for pain, if testing normal.        Shane Long MD      "

## 2025-08-03 ENCOUNTER — TELEPHONE (OUTPATIENT)
Dept: OBSTETRICS AND GYNECOLOGY | Facility: CLINIC | Age: 35
End: 2025-08-03
Payer: COMMERCIAL

## 2025-08-03 LAB
BACTERIA UR CULT: ABNORMAL
BACTERIA UR CULT: ABNORMAL

## 2025-08-03 RX ORDER — AMOXICILLIN 500 MG/1
500 CAPSULE ORAL 3 TIMES DAILY
Qty: 21 CAPSULE | Refills: 0 | Status: SHIPPED | OUTPATIENT
Start: 2025-08-03 | End: 2025-08-10

## 2025-08-03 RX ORDER — FLUCONAZOLE 150 MG/1
150 TABLET ORAL ONCE
Qty: 1 TABLET | Refills: 0 | Status: SHIPPED | OUTPATIENT
Start: 2025-08-03 | End: 2025-08-03

## 2025-08-04 LAB
CYTOLOGIST CVX/VAG CYTO: NORMAL
CYTOLOGY CVX/VAG DOC CYTO: NORMAL
CYTOLOGY CVX/VAG DOC THIN PREP: NORMAL
DX ICD CODE: NORMAL
HPV I/H RISK 4 DNA CVX QL PROBE+SIG AMP: NEGATIVE
OTHER STN SPEC: NORMAL
SERVICE CMNT-IMP: NORMAL
STAT OF ADQ CVX/VAG CYTO-IMP: NORMAL

## 2025-08-11 ENCOUNTER — OFFICE VISIT (OUTPATIENT)
Dept: OBSTETRICS AND GYNECOLOGY | Facility: CLINIC | Age: 35
End: 2025-08-11
Payer: COMMERCIAL

## 2025-08-11 VITALS
HEIGHT: 67 IN | SYSTOLIC BLOOD PRESSURE: 112 MMHG | BODY MASS INDEX: 42.85 KG/M2 | DIASTOLIC BLOOD PRESSURE: 72 MMHG | WEIGHT: 273 LBS

## 2025-08-11 DIAGNOSIS — Z30.431 IUD CHECK UP: ICD-10-CM

## 2025-08-11 DIAGNOSIS — R10.2 PELVIC PAIN: Primary | ICD-10-CM

## 2025-08-11 PROCEDURE — 99213 OFFICE O/P EST LOW 20 MIN: CPT | Performed by: OBSTETRICS & GYNECOLOGY

## 2025-08-11 RX ORDER — ALBUTEROL SULFATE 90 UG/1
2 INHALANT RESPIRATORY (INHALATION)
COMMUNITY
Start: 2025-04-25

## 2025-08-25 RX ORDER — FLUCONAZOLE 150 MG/1
150 TABLET ORAL ONCE
Qty: 1 TABLET | Refills: 0 | Status: SHIPPED | OUTPATIENT
Start: 2025-08-25 | End: 2025-08-25

## 2025-08-25 RX ORDER — AMOXICILLIN 500 MG/1
500 CAPSULE ORAL 3 TIMES DAILY
Qty: 21 CAPSULE | Refills: 0 | OUTPATIENT
Start: 2025-08-25 | End: 2025-09-01